# Patient Record
Sex: FEMALE | Race: WHITE | Employment: FULL TIME | URBAN - METROPOLITAN AREA
[De-identification: names, ages, dates, MRNs, and addresses within clinical notes are randomized per-mention and may not be internally consistent; named-entity substitution may affect disease eponyms.]

---

## 2018-11-20 ENCOUNTER — OFFICE VISIT (OUTPATIENT)
Dept: FAMILY MEDICINE CLINIC | Age: 38
End: 2018-11-20
Payer: COMMERCIAL

## 2018-11-20 VITALS
SYSTOLIC BLOOD PRESSURE: 91 MMHG | HEIGHT: 68 IN | HEART RATE: 70 BPM | WEIGHT: 127.4 LBS | DIASTOLIC BLOOD PRESSURE: 68 MMHG | OXYGEN SATURATION: 100 % | BODY MASS INDEX: 19.31 KG/M2

## 2018-11-20 DIAGNOSIS — Z11.4 SCREENING FOR HIV (HUMAN IMMUNODEFICIENCY VIRUS): ICD-10-CM

## 2018-11-20 DIAGNOSIS — R10.30 LOWER ABDOMINAL PAIN: ICD-10-CM

## 2018-11-20 DIAGNOSIS — E55.9 VITAMIN D DEFICIENCY: ICD-10-CM

## 2018-11-20 DIAGNOSIS — Z13.220 SCREENING, LIPID: ICD-10-CM

## 2018-11-20 DIAGNOSIS — R73.9 HYPERGLYCEMIA: ICD-10-CM

## 2018-11-20 DIAGNOSIS — R19.7 DIARRHEA, UNSPECIFIED TYPE: Primary | ICD-10-CM

## 2018-11-20 DIAGNOSIS — E53.9 VITAMIN B DEFICIENCY: ICD-10-CM

## 2018-11-20 DIAGNOSIS — Z13.29 SCREENING FOR THYROID DISORDER: ICD-10-CM

## 2018-11-20 PROCEDURE — G8427 DOCREV CUR MEDS BY ELIG CLIN: HCPCS | Performed by: FAMILY MEDICINE

## 2018-11-20 PROCEDURE — 99214 OFFICE O/P EST MOD 30 MIN: CPT | Performed by: FAMILY MEDICINE

## 2018-11-20 PROCEDURE — G8420 CALC BMI NORM PARAMETERS: HCPCS | Performed by: FAMILY MEDICINE

## 2018-11-20 PROCEDURE — G8484 FLU IMMUNIZE NO ADMIN: HCPCS | Performed by: FAMILY MEDICINE

## 2018-11-20 PROCEDURE — 1036F TOBACCO NON-USER: CPT | Performed by: FAMILY MEDICINE

## 2018-11-20 ASSESSMENT — ENCOUNTER SYMPTOMS
EYE PAIN: 0
VOMITING: 0
TROUBLE SWALLOWING: 0
BACK PAIN: 0
VOICE CHANGE: 0
RECTAL PAIN: 0
SHORTNESS OF BREATH: 0
DIARRHEA: 1
BLOOD IN STOOL: 0
ABDOMINAL PAIN: 0
EYE DISCHARGE: 0
COLOR CHANGE: 0
CHEST TIGHTNESS: 0
NAUSEA: 0
CONSTIPATION: 0
ANAL BLEEDING: 0
EYE REDNESS: 0
ABDOMINAL DISTENTION: 0
COUGH: 0
SINUS PRESSURE: 0

## 2018-11-20 ASSESSMENT — PATIENT HEALTH QUESTIONNAIRE - PHQ9
2. FEELING DOWN, DEPRESSED OR HOPELESS: 1
SUM OF ALL RESPONSES TO PHQ QUESTIONS 1-9: 1
1. LITTLE INTEREST OR PLEASURE IN DOING THINGS: 0
SUM OF ALL RESPONSES TO PHQ9 QUESTIONS 1 & 2: 0
SUM OF ALL RESPONSES TO PHQ QUESTIONS 1-9: 1
SUM OF ALL RESPONSES TO PHQ9 QUESTIONS 1 & 2: 1
SUM OF ALL RESPONSES TO PHQ QUESTIONS 1-9: 0
1. LITTLE INTEREST OR PLEASURE IN DOING THINGS: 0
SUM OF ALL RESPONSES TO PHQ QUESTIONS 1-9: 0
2. FEELING DOWN, DEPRESSED OR HOPELESS: 0

## 2018-11-24 LAB
ABSOLUTE BASO #: 0 K/UL (ref 0–0.1)
ABSOLUTE EOS #: 0.1 K/UL (ref 0.1–0.4)
ABSOLUTE LYMPH #: 2.5 K/UL (ref 0.8–5.2)
ABSOLUTE MONO #: 0.5 K/UL (ref 0.1–0.9)
ABSOLUTE NEUT #: 1.2 K/UL (ref 1.3–9.1)
ALBUMIN SERPL-MCNC: 4.2 G/DL (ref 3.5–5.2)
ALK PHOSPHATASE: 44 U/L (ref 30–101)
ALT SERPL-CCNC: 22 U/L (ref 5–40)
ANION GAP SERPL CALCULATED.3IONS-SCNC: 11 MEQ/L (ref 10–19)
AST SERPL-CCNC: 21 U/L (ref 9–40)
BASOPHILS RELATIVE PERCENT: 0.9 %
BILIRUB SERPL-MCNC: 0.6 MG/DL
BUN BLDV-MCNC: 13 MG/DL (ref 8–23)
CALCIUM SERPL-MCNC: 9.3 MG/DL (ref 8.5–10.5)
CHLORIDE BLD-SCNC: 103 MEQ/L (ref 95–107)
CHOLESTEROL/HDL RATIO: 2
CHOLESTEROL: 163 MG/DL
CO2: 26 MEQ/L (ref 19–31)
CREAT SERPL-MCNC: 0.8 MG/DL (ref 0.6–1.3)
EGFR AFRICAN AMERICAN: 108.4 ML/MIN/1.73 M2
EGFR IF NONAFRICAN AMERICAN: 93.5 ML/MIN/1.73 M2
EOSINOPHILS RELATIVE PERCENT: 1.6 %
FOLATE: 14 UG/L
GLUCOSE: 87 MG/DL (ref 70–99)
HCT VFR BLD CALC: 36.7 % (ref 36–48)
HDLC SERPL-MCNC: 79.6 MG/DL
HEMOGLOBIN: 12.2 G/DL (ref 12–16)
HIV 1,2 COMBO ANTIGEN/ANTIBODY: NORMAL
IRON SATURATION: 33 % (ref 20–50)
IRON, SERUM: 108 UG/DL (ref 37–145)
LDL CHOLESTEROL CALCULATED: 72 MG/DL
LDL/HDL RATIO: 0.9
LYMPHOCYTE %: 58 %
MCH RBC QN AUTO: 26.7 PG (ref 27–34)
MCHC RBC AUTO-ENTMCNC: 33.2 G/DL (ref 31–36)
MCV RBC AUTO: 80.3 FL (ref 80–100)
MONOCYTES # BLD: 11.3 %
NEUTROPHILS RELATIVE PERCENT: 28.2 %
PDW BLD-RTO: 13.1 % (ref 10.8–14.8)
PLATELETS: 262 K/UL (ref 150–450)
POTASSIUM SERPL-SCNC: 4.3 MEQ/L (ref 3.5–5.4)
RBC: 4.57 M/UL (ref 4–5.5)
SODIUM BLD-SCNC: 140 MEQ/L (ref 135–146)
T4 FREE: 1.16 NG/DL (ref 0.8–1.9)
TOTAL IRON BINDING CAPACITY: 330 MCG/DL (ref 250–450)
TOTAL PROTEIN: 6.7 G/DL (ref 6.1–8.3)
TRIGL SERPL-MCNC: 58 MG/DL
TSH SERPL DL<=0.05 MIU/L-ACNC: 2.01 UIU/ML (ref 0.4–4.1)
UNSATURATED IRON BINDING CAPACITY: 222.2 UG/DL (ref 112–347)
VITAMIN B-12: 512 PG/ML (ref 200–950)
VLDLC SERPL CALC-MCNC: 12 MG/DL
WBC: 4.3 K/UL (ref 3.7–10.8)

## 2018-11-27 LAB
T3 FREE: 2.7 PG/ML (ref 2.2–4.2)
VITAMIN D 25-HYDROXY: 34 NG/ML

## 2018-11-28 LAB
AVERAGE GLUCOSE: NORMAL MG/DL (ref 66–114)
HBA1C MFR BLD: NORMAL %

## 2019-03-18 ENCOUNTER — OFFICE VISIT (OUTPATIENT)
Dept: FAMILY MEDICINE CLINIC | Age: 39
End: 2019-03-18
Payer: COMMERCIAL

## 2019-03-18 VITALS
DIASTOLIC BLOOD PRESSURE: 65 MMHG | WEIGHT: 125.8 LBS | OXYGEN SATURATION: 100 % | HEIGHT: 68 IN | SYSTOLIC BLOOD PRESSURE: 98 MMHG | BODY MASS INDEX: 19.07 KG/M2 | HEART RATE: 67 BPM

## 2019-03-18 DIAGNOSIS — N92.6 MENSTRUAL IRREGULARITY: ICD-10-CM

## 2019-03-18 DIAGNOSIS — L29.9 ITCHING: Primary | ICD-10-CM

## 2019-03-18 DIAGNOSIS — R10.9 ABDOMINAL CRAMPS: ICD-10-CM

## 2019-03-18 DIAGNOSIS — N95.1 PERIMENOPAUSE: ICD-10-CM

## 2019-03-18 DIAGNOSIS — R19.7 DIARRHEA, UNSPECIFIED TYPE: ICD-10-CM

## 2019-03-18 PROCEDURE — G8484 FLU IMMUNIZE NO ADMIN: HCPCS | Performed by: FAMILY MEDICINE

## 2019-03-18 PROCEDURE — 99214 OFFICE O/P EST MOD 30 MIN: CPT | Performed by: FAMILY MEDICINE

## 2019-03-18 PROCEDURE — G8420 CALC BMI NORM PARAMETERS: HCPCS | Performed by: FAMILY MEDICINE

## 2019-03-18 PROCEDURE — G8427 DOCREV CUR MEDS BY ELIG CLIN: HCPCS | Performed by: FAMILY MEDICINE

## 2019-03-18 PROCEDURE — 1036F TOBACCO NON-USER: CPT | Performed by: FAMILY MEDICINE

## 2019-03-18 RX ORDER — FAMOTIDINE 20 MG/1
20 TABLET, FILM COATED ORAL 2 TIMES DAILY
Qty: 60 TABLET | Refills: 0 | Status: SHIPPED | OUTPATIENT
Start: 2019-03-18 | End: 2019-05-21

## 2019-03-18 RX ORDER — CETIRIZINE HYDROCHLORIDE 10 MG/1
10 TABLET ORAL DAILY
Qty: 30 TABLET | Refills: 0 | Status: SHIPPED | OUTPATIENT
Start: 2019-03-18 | End: 2019-04-17

## 2019-03-18 ASSESSMENT — ENCOUNTER SYMPTOMS
SHORTNESS OF BREATH: 0
ANAL BLEEDING: 0
VOICE CHANGE: 0
COLOR CHANGE: 0
CHEST TIGHTNESS: 0
BACK PAIN: 0
VOMITING: 0
CONSTIPATION: 0
EYE DISCHARGE: 0
SINUS PRESSURE: 0
COUGH: 0
TROUBLE SWALLOWING: 0
RECTAL PAIN: 0
ABDOMINAL PAIN: 0
ABDOMINAL DISTENTION: 0
DIARRHEA: 0
NAUSEA: 0
EYE PAIN: 0
BLOOD IN STOOL: 0
EYE REDNESS: 0

## 2019-03-18 ASSESSMENT — PATIENT HEALTH QUESTIONNAIRE - PHQ9
1. LITTLE INTEREST OR PLEASURE IN DOING THINGS: 0
SUM OF ALL RESPONSES TO PHQ QUESTIONS 1-9: 0
2. FEELING DOWN, DEPRESSED OR HOPELESS: 0
2. FEELING DOWN, DEPRESSED OR HOPELESS: 0
SUM OF ALL RESPONSES TO PHQ QUESTIONS 1-9: 0
1. LITTLE INTEREST OR PLEASURE IN DOING THINGS: 0
SUM OF ALL RESPONSES TO PHQ QUESTIONS 1-9: 0
SUM OF ALL RESPONSES TO PHQ9 QUESTIONS 1 & 2: 0
SUM OF ALL RESPONSES TO PHQ9 QUESTIONS 1 & 2: 0
SUM OF ALL RESPONSES TO PHQ QUESTIONS 1-9: 0

## 2019-05-10 ENCOUNTER — TELEPHONE (OUTPATIENT)
Dept: GASTROENTEROLOGY | Age: 39
End: 2019-05-10

## 2019-05-10 NOTE — LETTER
[address] [facility]  [address] [address]  Phone: [phone]  Fax: [place] [address]    5/10/2019        Dear [facility],    We have tried to reach you by phone and have not been successful. Your appointment on 05/21/19 has been move up to an eariler time due to a change in your provider's schedule. We rescheduled your appointment for :    Date Time Provider Department   5/21/2019  2:30 PM Nisha Moody MD [facility] PROVIDERS [facility]       We apologize for the inconvenience and appreciate your understanding. If you are unable to make this appointment, please call the office at [phone] (option 3).     Sincerely,  Nisha Moody MD

## 2019-05-21 ENCOUNTER — OFFICE VISIT (OUTPATIENT)
Dept: GASTROENTEROLOGY | Age: 39
End: 2019-05-21
Payer: COMMERCIAL

## 2019-05-21 VITALS
SYSTOLIC BLOOD PRESSURE: 90 MMHG | HEART RATE: 65 BPM | BODY MASS INDEX: 19.01 KG/M2 | WEIGHT: 125 LBS | DIASTOLIC BLOOD PRESSURE: 60 MMHG

## 2019-05-21 DIAGNOSIS — R19.5 LOOSE STOOLS: ICD-10-CM

## 2019-05-21 PROCEDURE — G8420 CALC BMI NORM PARAMETERS: HCPCS | Performed by: INTERNAL MEDICINE

## 2019-05-21 PROCEDURE — 1036F TOBACCO NON-USER: CPT | Performed by: INTERNAL MEDICINE

## 2019-05-21 PROCEDURE — 99203 OFFICE O/P NEW LOW 30 MIN: CPT | Performed by: INTERNAL MEDICINE

## 2019-05-21 PROCEDURE — G8427 DOCREV CUR MEDS BY ELIG CLIN: HCPCS | Performed by: INTERNAL MEDICINE

## 2019-05-21 RX ORDER — WHEAT DEXTRIN 3 G/3.8 G
4 POWDER (GRAM) ORAL DAILY
Qty: 30 CAN | Refills: 0 | Status: SHIPPED | OUTPATIENT
Start: 2019-05-21 | End: 2020-03-26

## 2019-05-21 NOTE — PROGRESS NOTES
GI CLINIC FOLLOW UP    INTERVAL HISTORY:   Ignacia Hoffman MD  [address], [address] [facility]    Chief Complaint   Patient presents with    Diarrhea     Started loose stool last September, once or twice a day. Has had cramps on occasion that come out of nowhere but not related to the loose stool. The cramps are lower stomach like the gut.  Other     She also complains that she also have been itching all over her body for months as well and lotion does not help           HISTORY OF PRESENT ILLNESS: Brian Turner is a 45 y.o. female with a pasthistory remarkable for sarcoidosis, pulmonary involvement, possible cutaneous involvement, referred for evaluation of loose bowel movements and increase amount of gas distention. Loose bowel movements, once daily. No floating, no blood. No preceding symptoms, x 5 months. Stomach cramps: losing weight, gaining. No changes in appetite. No recent travel. No changes in diet. Evening, unrelated to PO intake. Once a week. Duration of 1 hour. Flatus. No obvious food triggers. FH- no celiac, GF- stomach CA, Mother- IBS  No drinking, No smoking  No prior EGD or Colonoscopy   No food allergies. Irregular menses also noted at that time- prolonged few days. Past Medical,Family, and Social History reviewed and does contribute to the patient presentingcondition. Patient's PMH/PSH,SH,PSYCH Hx, MEDs, ALLERGIES, and ROS were all reviewed and updated in the appropriate sections.     PAST MEDICAL HISTORY:  Past Medical History:   Diagnosis Date    Autoimmune disorder ([address]     sarcoidosis age 25 resolved   Tarri Shaggy Blood type B+     Drug effect     penicillin rash     yeast infection     Family history of autism     Son (P1)    Sarcoidosis of skin ([address]     and lung     Trauma     rt arm fracture age 23       Past Surgical History:   Procedure Laterality Date    LUNG BIOPSY      sarcoidosis    WISDOM TOOTH EXTRACTION      age 15 CURRENT MEDICATIONS:  No current outpatient medications on file.     ALLERGIES:   Allergies   Allergen Reactions    Penicillins Hives and Rash     Yeast infection       FAMILY HISTORY:       Problem Relation Age of Onset   [facility] Asthma Mother     Other Mother         blood transfusion    Breast Cancer Maternal Grandmother     Parkinsonism Maternal Grandmother     Diabetes Maternal Grandmother     Alzheimer's Disease Paternal Grandfather     Diabetes Father         Type 2         SOCIAL HISTORY:   Social History     Socioeconomic History    Marital status:      Spouse name: Not on file    Number of children: Not on file    Years of education: Not on file    Highest education level: Not on file   Occupational History    Not on file   Social Needs    Financial resource strain: Not on file    Food insecurity:     Worry: Not on file     Inability: Not on file    Transportation needs:     Medical: Not on file     Non-medical: Not on file   Tobacco Use    Smoking status: Never Smoker    Smokeless tobacco: Never Used   Substance and Sexual Activity    Alcohol use: Yes     Comment: occasionally    Drug use: No    Sexual activity: Yes     Partners: Male   Lifestyle    Physical activity:     Days per week: Not on file     Minutes per session: Not on file    Stress: Not on file   Relationships    Social connections:     Talks on phone: Not on file     Gets together: Not on file     Attends Restoration service: Not on file     Active member of club or organization: Not on file     Attends meetings of clubs or organizations: Not on file     Relationship status: Not on file    Intimate partner violence:     Fear of current or ex partner: Not on file     Emotionally abused: Not on file     Physically abused: Not on file     Forced sexual activity: Not on file   Other Topics Concern    Not on file   Social History Narrative    Not on file       REVIEW OF SYSTEMS: A 12-point review of systemswas

## 2019-05-28 ENCOUNTER — TELEPHONE (OUTPATIENT)
Dept: GASTROENTEROLOGY | Age: 39
End: 2019-05-28

## 2019-05-28 NOTE — TELEPHONE ENCOUNTER
Writer received voicemail from patient questioning if her labs were fasting. Writer called patient back and informed her that her labs are not fasting and she can have them done at any time. Writer states if any questions call [phone].

## 2019-05-29 ENCOUNTER — HOSPITAL ENCOUNTER (OUTPATIENT)
Age: 39
Setting detail: SPECIMEN
Discharge: HOME | End: 2019-05-29
Payer: COMMERCIAL

## 2019-05-29 DIAGNOSIS — R19.5 LOOSE STOOLS: ICD-10-CM

## 2019-05-29 LAB
ABSOLUTE EOS #: 0.07 K/UL (ref 0–0.44)
ABSOLUTE IMMATURE GRANULOCYTE: <0.03 K/UL (ref 0–0.3)
ABSOLUTE LYMPH #: 1.86 K/UL (ref 1.1–3.7)
ABSOLUTE MONO #: 0.49 K/UL (ref 0.1–1.2)
ALBUMIN SERPL-MCNC: 4.3 G/DL (ref 3.5–5.2)
ALBUMIN/GLOBULIN RATIO: 1.5 (ref 1–2.5)
ALP BLD-CCNC: 67 U/L (ref 35–104)
ALT SERPL-CCNC: 30 U/L (ref 5–33)
ANION GAP SERPL CALCULATED.3IONS-SCNC: 13 MMOL/L (ref 9–17)
AST SERPL-CCNC: 34 U/L
BASOPHILS # BLD: 1 % (ref 0–2)
BASOPHILS ABSOLUTE: 0.05 K/UL (ref 0–0.2)
BILIRUB SERPL-MCNC: 0.38 MG/DL (ref 0.3–1.2)
BILIRUBIN DIRECT: 0.09 MG/DL
BILIRUBIN, INDIRECT: 0.29 MG/DL (ref 0–1)
BUN BLDV-MCNC: 12 MG/DL (ref 6–20)
BUN/CREAT BLD: NORMAL (ref 9–20)
CALCIUM SERPL-MCNC: 9.3 MG/DL (ref 8.6–10.4)
CHLORIDE BLD-SCNC: 102 MMOL/L (ref 98–107)
CO2: 26 MMOL/L (ref 20–31)
CREAT SERPL-MCNC: 0.71 MG/DL (ref 0.5–0.9)
DIFFERENTIAL TYPE: ABNORMAL
EOSINOPHILS RELATIVE PERCENT: 1 % (ref 1–4)
FOLATE: 16 NG/ML
GFR AFRICAN AMERICAN: >60 ML/MIN
GFR NON-AFRICAN AMERICAN: >60 ML/MIN
GFR SERPL CREATININE-BSD FRML MDRD: NORMAL ML/MIN/{1.73_M2}
GFR SERPL CREATININE-BSD FRML MDRD: NORMAL ML/MIN/{1.73_M2}
GLOBULIN: ABNORMAL G/DL (ref 1.5–3.8)
GLUCOSE BLD-MCNC: 81 MG/DL (ref 70–99)
HCT VFR BLD CALC: 39.2 % (ref 36.3–47.1)
HEMOGLOBIN: 11.7 G/DL (ref 11.9–15.1)
IMMATURE GRANULOCYTES: 0 %
IRON SATURATION: 16 % (ref 20–55)
IRON: 55 UG/DL (ref 37–145)
LYMPHOCYTES # BLD: 27 % (ref 24–43)
MCH RBC QN AUTO: 25.3 PG (ref 25.2–33.5)
MCHC RBC AUTO-ENTMCNC: 29.8 G/DL (ref 28.4–34.8)
MCV RBC AUTO: 84.7 FL (ref 82.6–102.9)
MONOCYTES # BLD: 7 % (ref 3–12)
NRBC AUTOMATED: 0 PER 100 WBC
PDW BLD-RTO: 14.6 % (ref 11.8–14.4)
PLATELET # BLD: 283 K/UL (ref 138–453)
PLATELET ESTIMATE: ABNORMAL
PMV BLD AUTO: 11.1 FL (ref 8.1–13.5)
POTASSIUM SERPL-SCNC: 3.9 MMOL/L (ref 3.7–5.3)
RBC # BLD: 4.63 M/UL (ref 3.95–5.11)
RBC # BLD: ABNORMAL 10*6/UL
SEG NEUTROPHILS: 64 % (ref 36–65)
SEGMENTED NEUTROPHILS ABSOLUTE COUNT: 4.38 K/UL (ref 1.5–8.1)
SODIUM BLD-SCNC: 141 MMOL/L (ref 135–144)
TOTAL IRON BINDING CAPACITY: 343 UG/DL (ref 250–450)
TOTAL PROTEIN: 7.2 G/DL (ref 6.4–8.3)
UNSATURATED IRON BINDING CAPACITY: 288 UG/DL (ref 112–347)
VITAMIN B-12: 414 PG/ML (ref 232–1245)
WBC # BLD: 6.9 K/UL (ref 3.5–11.3)
WBC # BLD: ABNORMAL 10*3/UL

## 2019-05-30 LAB
GLIADIN DEAMINIDATED PEPTIDE AB IGA: 0.9 U/ML
GLIADIN DEAMINIDATED PEPTIDE AB IGG: <0.4 U/ML
IGA: 233 MG/DL (ref 70–400)
TISSUE TRANSGLUTAMINASE IGA: 0.3 U/ML

## 2019-06-04 LAB — IBD PANEL: NORMAL

## 2020-03-26 ENCOUNTER — OFFICE VISIT (OUTPATIENT)
Dept: FAMILY MEDICINE CLINIC | Age: 40
End: 2020-03-26
Payer: COMMERCIAL

## 2020-03-26 ENCOUNTER — HOSPITAL ENCOUNTER (OUTPATIENT)
Age: 40
Setting detail: SPECIMEN
Discharge: HOME | End: 2020-03-26
Payer: COMMERCIAL

## 2020-03-26 ENCOUNTER — TELEPHONE (OUTPATIENT)
Dept: FAMILY MEDICINE CLINIC | Age: 40
End: 2020-03-26

## 2020-03-26 VITALS
BODY MASS INDEX: 18.66 KG/M2 | HEART RATE: 53 BPM | WEIGHT: 126 LBS | SYSTOLIC BLOOD PRESSURE: 92 MMHG | OXYGEN SATURATION: 100 % | DIASTOLIC BLOOD PRESSURE: 61 MMHG | HEIGHT: 69 IN

## 2020-03-26 PROBLEM — R30.0 DYSURIA: Status: ACTIVE | Noted: 2020-03-26

## 2020-03-26 PROBLEM — R10.9 FLANK PAIN: Status: ACTIVE | Noted: 2020-03-26

## 2020-03-26 LAB
BILIRUBIN, POC: NORMAL
BLOOD URINE, POC: NORMAL
CLARITY, POC: CLEAR
COLOR, POC: YELLOW
GLUCOSE URINE, POC: NORMAL
KETONES, POC: NORMAL
LEUKOCYTE EST, POC: NORMAL
NITRITE, POC: NORMAL
PH, POC: 6
PROTEIN, POC: NORMAL
SPECIFIC GRAVITY, POC: 1.01
UROBILINOGEN, POC: 0.2

## 2020-03-26 PROCEDURE — G8427 DOCREV CUR MEDS BY ELIG CLIN: HCPCS | Performed by: FAMILY MEDICINE

## 2020-03-26 PROCEDURE — 1036F TOBACCO NON-USER: CPT | Performed by: FAMILY MEDICINE

## 2020-03-26 PROCEDURE — 81003 URINALYSIS AUTO W/O SCOPE: CPT | Performed by: FAMILY MEDICINE

## 2020-03-26 PROCEDURE — 99213 OFFICE O/P EST LOW 20 MIN: CPT | Performed by: FAMILY MEDICINE

## 2020-03-26 PROCEDURE — G8420 CALC BMI NORM PARAMETERS: HCPCS | Performed by: FAMILY MEDICINE

## 2020-03-26 PROCEDURE — G8484 FLU IMMUNIZE NO ADMIN: HCPCS | Performed by: FAMILY MEDICINE

## 2020-03-26 ASSESSMENT — PATIENT HEALTH QUESTIONNAIRE - PHQ9
SUM OF ALL RESPONSES TO PHQ QUESTIONS 1-9: 0
SUM OF ALL RESPONSES TO PHQ9 QUESTIONS 1 & 2: 0
2. FEELING DOWN, DEPRESSED OR HOPELESS: 0
SUM OF ALL RESPONSES TO PHQ QUESTIONS 1-9: 0
SUM OF ALL RESPONSES TO PHQ QUESTIONS 1-9: 0
1. LITTLE INTEREST OR PLEASURE IN DOING THINGS: 0
2. FEELING DOWN, DEPRESSED OR HOPELESS: 0
SUM OF ALL RESPONSES TO PHQ QUESTIONS 1-9: 0
1. LITTLE INTEREST OR PLEASURE IN DOING THINGS: 0
SUM OF ALL RESPONSES TO PHQ9 QUESTIONS 1 & 2: 0

## 2020-03-26 NOTE — PROGRESS NOTES
Urinalysis Reflex to Culture    CBC With Auto Differential   2. Flank pain  CBC With Auto Differential    Basic Metabolic Panel         Plan:      Orders Placed This Encounter   Procedures    Urinalysis Reflex to Culture    CBC With Auto Differential    Basic Metabolic Panel    POCT Urinalysis No Micro (Auto)       Outpatient Encounter Medications as of 3/26/2020   Medication Sig Dispense Refill    [DISCONTINUED] Wheat Dextrin (BENEFIBER) POWD Take 4 g by mouth daily (Patient not taking: Reported on 3/26/2020) 30 Can 0     No facility-administered encounter medications on file as of 3/26/2020.         Mild tenderness left flank with palpation, will do CBC and BMP if sx persist.UA sent out for c/s    Christophe Johnston MD

## 2020-03-26 NOTE — TELEPHONE ENCOUNTER
Pt called stating she woke up today having burning with urination. Pt would like something called in for UTI. Please call & advise.     Last ov: 3/18/19  Next ov: nothing scheduled

## 2020-03-27 LAB
-: NORMAL
AMORPHOUS: NORMAL
BACTERIA: NORMAL
BILIRUBIN URINE: NEGATIVE
CASTS UA: NORMAL /LPF (ref 0–8)
COLOR: YELLOW
COMMENT UA: ABNORMAL
CRYSTALS, UA: NORMAL /HPF
CULTURE: NORMAL
EPITHELIAL CELLS UA: NORMAL /HPF (ref 0–5)
GLUCOSE URINE: NEGATIVE
KETONES, URINE: NEGATIVE
LEUKOCYTE ESTERASE, URINE: ABNORMAL
Lab: NORMAL
MUCUS: NORMAL
NITRITE, URINE: NEGATIVE
OTHER OBSERVATIONS UA: NORMAL
PH UA: 6 (ref 5–8)
PROTEIN UA: NEGATIVE
RBC UA: NORMAL /HPF (ref 0–4)
RENAL EPITHELIAL, UA: NORMAL /HPF
SPECIFIC GRAVITY UA: 1.01 (ref 1–1.03)
SPECIMEN DESCRIPTION: NORMAL
TRICHOMONAS: NORMAL
TURBIDITY: CLEAR
URINE HGB: NEGATIVE
UROBILINOGEN, URINE: NORMAL
WBC UA: NORMAL /HPF (ref 0–5)
YEAST: NORMAL

## 2022-02-21 ENCOUNTER — OFFICE VISIT (OUTPATIENT)
Dept: FAMILY MEDICINE CLINIC | Age: 42
End: 2022-02-21
Payer: COMMERCIAL

## 2022-02-21 VITALS
OXYGEN SATURATION: 98 % | WEIGHT: 130.2 LBS | SYSTOLIC BLOOD PRESSURE: 97 MMHG | HEART RATE: 57 BPM | DIASTOLIC BLOOD PRESSURE: 61 MMHG | HEIGHT: 69 IN | BODY MASS INDEX: 19.28 KG/M2 | TEMPERATURE: 97.2 F

## 2022-02-21 DIAGNOSIS — M25.559 HIP PAIN: ICD-10-CM

## 2022-02-21 DIAGNOSIS — E53.9 VITAMIN B DEFICIENCY: ICD-10-CM

## 2022-02-21 DIAGNOSIS — R73.9 HYPERGLYCEMIA: ICD-10-CM

## 2022-02-21 DIAGNOSIS — E55.9 VITAMIN D DEFICIENCY: ICD-10-CM

## 2022-02-21 DIAGNOSIS — Z13.220 SCREENING FOR LIPID DISORDERS: ICD-10-CM

## 2022-02-21 DIAGNOSIS — Z11.4 ENCOUNTER FOR SCREENING FOR HIV: ICD-10-CM

## 2022-02-21 DIAGNOSIS — Z13.29 SCREENING FOR THYROID DISORDER: Primary | ICD-10-CM

## 2022-02-21 DIAGNOSIS — Z11.4 SCREENING FOR HIV (HUMAN IMMUNODEFICIENCY VIRUS): ICD-10-CM

## 2022-02-21 PROCEDURE — 99214 OFFICE O/P EST MOD 30 MIN: CPT | Performed by: FAMILY MEDICINE

## 2022-02-21 SDOH — ECONOMIC STABILITY: FOOD INSECURITY: WITHIN THE PAST 12 MONTHS, YOU WORRIED THAT YOUR FOOD WOULD RUN OUT BEFORE YOU GOT MONEY TO BUY MORE.: NEVER TRUE

## 2022-02-21 SDOH — ECONOMIC STABILITY: FOOD INSECURITY: WITHIN THE PAST 12 MONTHS, THE FOOD YOU BOUGHT JUST DIDN'T LAST AND YOU DIDN'T HAVE MONEY TO GET MORE.: NEVER TRUE

## 2022-02-21 ASSESSMENT — ENCOUNTER SYMPTOMS
ABDOMINAL PAIN: 0
SINUS PRESSURE: 0
BACK PAIN: 0
RECTAL PAIN: 0
SHORTNESS OF BREATH: 0
DIARRHEA: 0
ANAL BLEEDING: 0
CONSTIPATION: 0
VOICE CHANGE: 0
EYE REDNESS: 0
ABDOMINAL DISTENTION: 0
NAUSEA: 0
COUGH: 0
VOMITING: 0
EYE PAIN: 0
TROUBLE SWALLOWING: 0
COLOR CHANGE: 0
CHEST TIGHTNESS: 0
BLOOD IN STOOL: 0
EYE DISCHARGE: 0

## 2022-02-21 ASSESSMENT — PATIENT HEALTH QUESTIONNAIRE - PHQ9
SUM OF ALL RESPONSES TO PHQ9 QUESTIONS 1 & 2: 0
SUM OF ALL RESPONSES TO PHQ QUESTIONS 1-9: 0
2. FEELING DOWN, DEPRESSED OR HOPELESS: 0
SUM OF ALL RESPONSES TO PHQ QUESTIONS 1-9: 0
2. FEELING DOWN, DEPRESSED OR HOPELESS: 0
SUM OF ALL RESPONSES TO PHQ QUESTIONS 1-9: 0
SUM OF ALL RESPONSES TO PHQ9 QUESTIONS 1 & 2: 0
1. LITTLE INTEREST OR PLEASURE IN DOING THINGS: 0
1. LITTLE INTEREST OR PLEASURE IN DOING THINGS: 0
SUM OF ALL RESPONSES TO PHQ QUESTIONS 1-9: 0

## 2022-02-21 ASSESSMENT — SOCIAL DETERMINANTS OF HEALTH (SDOH): HOW HARD IS IT FOR YOU TO PAY FOR THE VERY BASICS LIKE FOOD, HOUSING, MEDICAL CARE, AND HEATING?: NOT HARD AT ALL

## 2022-02-21 NOTE — PROGRESS NOTES
Subjective:      Patient ID: Sue Mondragon is a 39 y.o. female. States she has the runners B/L hips bother her regularly - over past 3 weeks the right hip pain is really bad. States she intensified her runs. States does do stretches before she runs. With newer routine- she was running 1 mile fast and 2 miles slow 3 days a week. Mood, sleep, appetite good. States BM are regular. No weight loss. Review of Systems   Constitutional: Negative for activity change, appetite change and fatigue. HENT: Negative for dental problem, ear pain, hearing loss, postnasal drip, sinus pressure, sneezing, tinnitus, trouble swallowing and voice change. Eyes: Negative for pain, discharge, redness and visual disturbance. Respiratory: Negative for cough, chest tightness and shortness of breath. Cardiovascular: Negative for chest pain, palpitations and leg swelling. Gastrointestinal: Negative for abdominal distention, abdominal pain, anal bleeding, blood in stool, constipation, diarrhea, nausea, rectal pain and vomiting. Endocrine: Negative for cold intolerance, heat intolerance, polydipsia, polyphagia and polyuria. Genitourinary: Negative for decreased urine volume, difficulty urinating, dyspareunia, dysuria, enuresis, flank pain, frequency, genital sores, hematuria, menstrual problem, pelvic pain, urgency, vaginal bleeding and vaginal discharge. Musculoskeletal: Positive for arthralgias. Negative for back pain, gait problem, joint swelling, myalgias, neck pain and neck stiffness. Skin: Negative for color change, pallor and rash. Allergic/Immunologic: Negative for environmental allergies, food allergies and immunocompromised state. Neurological: Negative for dizziness, tremors, seizures, syncope, facial asymmetry, speech difficulty, weakness, light-headedness, numbness and headaches. Hematological: Negative for adenopathy. Does not bruise/bleed easily.    Psychiatric/Behavioral: Negative for agitation, behavioral problems, confusion, decreased concentration, sleep disturbance and suicidal ideas. The patient is not nervous/anxious. Objective:   Physical Exam  Constitutional:       General: She is not in acute distress. HENT:      Head: Normocephalic and atraumatic. Right Ear: External ear normal.      Left Ear: External ear normal.      Nose: Nose normal.      Mouth/Throat:      Mouth: Mucous membranes are moist.   Eyes:      Conjunctiva/sclera: Conjunctivae normal.      Pupils: Pupils are equal, round, and reactive to light. Neck:      Thyroid: No thyromegaly. Trachea: No tracheal deviation. Cardiovascular:      Rate and Rhythm: Normal rate and regular rhythm. Pulses: Normal pulses. Heart sounds: Normal heart sounds. No murmur heard. No friction rub. No gallop. Pulmonary:      Effort: Pulmonary effort is normal. No respiratory distress. Breath sounds: Normal breath sounds. No stridor. No wheezing or rales. Chest:      Chest wall: No tenderness. Abdominal:      General: Bowel sounds are normal. There is no distension. Palpations: Abdomen is soft. Tenderness: There is no abdominal tenderness. There is no rebound. Musculoskeletal:         General: Normal range of motion. Cervical back: Normal range of motion. Lymphadenopathy:      Cervical: No cervical adenopathy. Skin:     General: Skin is warm. Coloration: Skin is not pale. Findings: No erythema or rash. Neurological:      General: No focal deficit present. Mental Status: She is alert and oriented to person, place, and time. Mental status is at baseline. Cranial Nerves: No cranial nerve deficit. Motor: No abnormal muscle tone. Deep Tendon Reflexes: Reflexes normal.   Psychiatric:         Mood and Affect: Mood normal.         Behavior: Behavior normal.         Thought Content:  Thought content normal.         Judgment: Judgment normal.          Vitals:    02/21/22 1503 BP: 97/61   Pulse: 57   Temp: 97.2 °F (36.2 °C)   SpO2: 98%         Assessment:      Diagnosis Orders   1. Hip pain           Plan:     15 minutes spent with patient counseling/educating on acute/chronic medical health problems, medications,  along with treatment options. Reviewed multiple labs/imaging/medications with patient during this time. Following Diet discussion/education was done on healthy diet. In addition Exercise plan and depression screen were discussed. Recent labs/imaging were discussed and reviewed with patient.

## 2022-03-02 ENCOUNTER — HOSPITAL ENCOUNTER (OUTPATIENT)
Dept: PHYSICAL THERAPY | Facility: CLINIC | Age: 42
Setting detail: THERAPIES SERIES
Discharge: HOME | End: 2022-03-02
Payer: COMMERCIAL

## 2022-03-02 LAB
ALBUMIN SERPL-MCNC: 4.2 G/DL (ref 3.2–5.3)
ALK PHOSPHATASE: 40 U/L (ref 39–130)
ALT SERPL-CCNC: 16 U/L (ref 0–31)
ANION GAP SERPL CALCULATED.3IONS-SCNC: 8 MMOL/L (ref 5–15)
AST SERPL-CCNC: 17 U/L (ref 0–41)
AVERAGE GLUCOSE: 117 MG/DL
BILIRUB SERPL-MCNC: 0.7 MG/DL (ref 0.3–1.2)
BUN BLDV-MCNC: 14 MG/DL (ref 5–23)
CALCIUM SERPL-MCNC: 8.6 MG/DL (ref 8.5–10.5)
CHLORIDE BLD-SCNC: 107 MMOL/L (ref 98–109)
CHOLESTEROL/HDL RATIO: 2.1 (ref 1–5)
CHOLESTEROL: 159 MG/DL (ref 150–200)
CO2: 26 MMOL/L (ref 22–32)
CREAT SERPL-MCNC: 0.8 MG/DL (ref 0.4–1)
EGFR AFRICAN AMERICAN: >60 ML/MIN/1.73SQ.M
EGFR IF NONAFRICAN AMERICAN: >60 ML/MIN/1.73SQ.M
EOSINOPHIL # BLD: 2.8 %
EOSINOPHILS ABSOLUTE: 0.1 X10E9/L (ref 0–0.4)
FOLATE: 9.7 NG/ML
GLUCOSE: 82 MG/DL (ref 65–99)
HBA1C MFR BLD: 5.7 % (ref 4.4–6.4)
HCT VFR BLD CALC: 32.8 % (ref 35–47)
HDLC SERPL-MCNC: 75 MG/DL
HEMOGLOBIN: 10.8 G/DL (ref 11.7–15.5)
HEPATITIS C ANTIBODY: NORMAL
LDL CHOLESTEROL CALCULATED: 73 MG/DL
LDL/HDL RATIO: 1
LYMPHOCYTES # BLD: 42.6 %
LYMPHOCYTES ABSOLUTE: 1.3 X10E9/L (ref 1–3.5)
MCH RBC QN AUTO: 24.5 PG (ref 27–34)
MCHC RBC AUTO-ENTMCNC: 32.8 G/DL (ref 32–36)
MCV RBC AUTO: 75 FL (ref 80–100)
MONOCYTES # BLD: 17.6 %
MONOCYTES ABSOLUTE: 0.5 X10E9/L (ref 0–0.9)
NEUTROPHILS ABSOLUTE: 1.1 X10E9/L (ref 1.5–6.6)
NEUTROPHILS RELATIVE PERCENT: 37 %
PDW BLD-RTO: 15.9 % (ref 11.5–15)
PLATELETS: 258 X10E9/L (ref 150–450)
PMV BLD AUTO: 8.1 FL (ref 7–12)
POTASSIUM SERPL-SCNC: 4 MMOL/L (ref 3.5–5)
RBC # BLD: ABNORMAL 10*6/UL
RBC: 4.4 X10E12/L (ref 3.8–5.2)
SODIUM BLD-SCNC: 141 MMOL/L (ref 134–146)
T4 FREE: 0.84 NG/DL (ref 0.61–1.6)
TOTAL PROTEIN: 6.4 G/DL (ref 6–8)
TRIGL SERPL-MCNC: 53 MG/DL (ref 27–150)
TSH SERPL DL<=0.05 MIU/L-ACNC: 1.52 UIU/ML (ref 0.49–4.67)
VITAMIN B-12: 207 PG/ML (ref 180–914)
VITAMIN D 25-HYDROXY: 26.1 NG/ML (ref 30–100)
VLDLC SERPL CALC-MCNC: 11 MG/DL (ref 0–30)
WBC: 3.1 X10E9/L (ref 4–11)

## 2022-03-02 PROCEDURE — 97161 PT EVAL LOW COMPLEX 20 MIN: CPT

## 2022-03-02 PROCEDURE — 97110 THERAPEUTIC EXERCISES: CPT

## 2022-03-02 PROCEDURE — 97140 MANUAL THERAPY 1/> REGIONS: CPT

## 2022-03-02 NOTE — CONSULTS
[x] [address]  [facility] [address]   [address]  P: [phone]  F: [phone] [] [address] [facility]  [address] [facility]   [address]  P: [phone]  F: [phone]       Physical Therapy Lower Extremity Evaluation    Date:  3/2/2022  Patient: Tristian Valero  : 1980  MRN: [id number]  Physician: Dr. Carrillo Guevara MD     Insurance: [employer] (40 Vs, HARD MAX, benefit period 2021-2022)  Medical Diagnosis: M25.559 - hip pain  Rehab Codes: M25.551, M25.552, M62.81  Onset date: 2022 referral  Next 's appt. : 2022    Subjective:   CC: bilateral anterior hip pain  HPI: Pt reports that beginning in 2020 she started noticing increased bilateral anterior hip tenderness and tightness following her runs and then she reports that pain levels became more frequent and she started noting increased pain while running. Pt reports improvement with taking the last 3 weeks off running. Pt reports that pain was coming on right away and would increase with running faster. Pt reports that now her R side is worse than the other but she notes that previously she has noticed that her L hip had increased pain. Pt reports increased bilateral piriformis tenderness. Pt reports that she generally runs 3 days per week and low mileage. Pt can think of two incidents that may have increased her pain, she reports icing her ankle with her R hip held in IR last summer for an extended period of time and more recently she increased her speed. Pt denies numbness and tingling. Pt reports that she typically runs in a [employer] running shoe.      PMHx: [x] Unremarkable [x] Other: sarcoidosis              [x] Refer to full medical chart  In EPIC       Comorbidities:   [] Obesity [] Dialysis  [x] N/A   [] Asthma/COPD [] Dementia [] Other:   [] Stroke [] Sleep apnea [] Other:   [] Vascular disease [] Rheumatic disease [] Other: Tests: [] X-Ray: [] MRI:  [] Other:    Medications: [x] Refer to full medical record  Allergies:      [x] Refer to full medical record     Function:  Hand Dominance  [x] Right    Employement [employer]   Job status Teacher   Work Activities/duties     Recreational Activities Running, swimming     ADL/IADL Previous level of function Current level of function Who currently assists the patient with task   All ADL's [x] Independent  [] Assist [x] Independent  [] Assist      Gait Prior level of function Current level of function    [x] Independent  [] Assist [x] Independent  [] Assist   Device: [x] Independent [x] Independent       Pain present? Yes   Location Bilateral anterior hips   Pain Rating currently 1/10   Pain altered treatment N/A   Pain at worse 7/10 while running   Pain at best 1/10   Description of pain Tender, occasionally sharp   Altered Sensation N/A   What makes it worse Running (faster running), prolonged sitting   What makes it better Rest, change in position   Symptom progression Improving with rest   Sleep Not disturbed           Objective:    ROM  ° A/P STRENGTH TESTS (+/-) Left Right Not Tested    Left Right Left Right Ant.  Drawer   []   Hip Flex   4+/5 5/5 Post. Drawer   []   Ext   4+/5 4/5 Lachmans   []   ER     Valgus Stress   []   IR     Varus Stress   []   ABD   4/5 4/5 Lonnies   []   ADD     Apleys Comp.   []   Knee Flex   5/5 5/5 Apleys Dist.   []   Ext   5/5 5/5 Hip Scouring + + []   Ankle DF (knee straight)   5/5 5/5 MILESs - - []   DF (knee bent)     Piriformis - + []   PF     Shirley's   []   INV     Neeraj   []   EVER     Talor Tilt   []   GTE     Pat-Fem Grind   []     Flexibility Normal Left tight Right tight   Hip flexor [] [x] [x]   quad [x] [] []   HS [x] [] []   piriformis [] [x] [x]   ITB [] [] []   gastroc [] [] [x]   Soleus  [] [] []    [] [] []    [] [] []        OBSERVATION No Deficit Deficit Not Tested Comments   Posture    Pt with resting lying posture with excessive bilateral LE ER   Forward Head [] [] []    Rounded Shoulders [] [] []    Kyphosis [] [] []    Lordosis [] [] []    Lateral Shift [] [] []    Scoliosis [] [] []    Iliac Crest [] [] []    PSIS [] [] []    ASIS [] [] []    Genu Valgus [x] [] []    Genu Varus [x] [] []    Genu Recurvatum [] [] []    Pronation [] [] []    Supination [] [] []    Leg Length Discrp [x] [] []    Slumped Sitting [] [x] []    Palpation [] [x] [] Tender to palpation with increased muscle spasm to bilateral hip flexor musculature and R piriformis   Sensation [x] [] []    Edema [x] [] []    Neurological [x] [] []    Patellar Mobility [x] [] []    Patellar Orientation [x] [] []    Gait [x] [] [] Analysis: no significant abnormalities present with normal walking gait, will perform run gait analysis when appropriate         FUNCTION Normal Difficult Unable   Sitting [] [x] []   Standing [] [x] []   Ambulation [] [x] []   Groom/Dress [x] [] []   Lift/Carry [] [x] []   Stairs [x] [] []   Bending [] [x] []   Squat [] [x] []   Kneel [x] [] []     BALANCE/PROPRIOCEPTION              [] Not tested   Single leg stance       R                     L                                PAIN   Eyes open                 30          Sec. 30      Sec                  . []    Eyes closed                          Sec. Sec                  . []    Compensation with trunk lean and pelvic rotation present with SL standing bilaterally    FUNCTIONAL TESTS PAIN NO PAIN COMMENTS   Step Test 4 [] []    6 [] []    8 [] []    Squat [] [x] Slight R LE weight shift and L valgus collapse, slight increased anterior translation  SL squat demonstrating impaired valgus control greater on the L     Functional Test: Lower Extremity Functional Index (LEFI) Score: 56/80, 30% functionally impaired     Comments:    Assessment:  Yuniel Grace is a 39 y.o. runner presenting with increasing bilateral anterior hip pain with running over the past year.  Pt reports activities. 3. Pt will demonstrate improved functional activity tolerance as evident by an improved score on the LEFI to <15% functional impairment. Patient goals: \"to be able to run pain-free\"     Rehab Potential:  [x] Good  [] Fair  [] Poor   Suggested Professional Referral:  [x] No  [] Yes:  Barriers to Goal Achievement:  [x] No  [] Yes:  Domestic Concerns:  [x] No  [] Yes:    Pt. Education:  [x] Plans/Goals, Risks/Benefits discussed  [x] Home exercise program    Method of Education: [x] Verbal  [x] Demo  [x] Written  Access Code: [email or web address]  URL: [email or web address]  Date: 03/02/2022  Prepared by: Lucía Hugo  Bridge + Band Circuit - 1-2 x daily - 7 x weekly - 4 sets - 5 reps  Clam with Resistance - 1-2 x daily - 7 x weekly - 2 sets - 10 reps  Sidelying Hip Extension in Abduction - 1-2 x daily - 7 x weekly - 2 sets - 10 reps  Prone Hip Extension with Bent Knee - Two Pillows - 1-2 x daily - 7 x weekly - 2 sets - 10 reps  Half Kneeling Hip Flexor Stretch - 1-2 x daily - 7 x weekly - 1 sets - 1 reps - 60 hold  Standing Bilateral Gastroc Stretch with Step - 1-2 x daily - 7 x weekly - 1 sets - 1 reps - 60 hold    Comprehension of Education:  [x] Verbalizes understanding. [x] Demonstrates understanding. [x] Needs Review. [] Demonstrates/verbalizes understanding of HEP/Ed previously given.     Treatment Plan:  [x] Therapeutic Exercise   09758  [] Iontophoresis: 4 mg/mL Dexamethasone Sodium Phosphate  mAmin  32095   [] Therapeutic Activity  75913 [x] Vasopneumatic cold with compression  53259    [x] Gait Training   77439 [] Ultrasound   30525   [x] Neuromuscular Re-education  50574 [] Electrical Stimulation Unattended  60556   [x] Manual Therapy  44341 [] Electrical Stimulation Attended  57523   [x] Instruction in HEP  [] Lumbar/Cervical Traction  01533   [] Aquatic Therapy   69782 [x] Cold/hotpack    [] Massage   55900      [] Dry Needling, 1 or 2 muscles  84511   [] Biofeedback, first 15 minutes   90912  [] Biofeedback, additional 15 minutes   90913 [] Dry Needling, 3 or more muscles  20561     []  Medication allergies reviewed for use of    Dexamethasone Sodium Phosphate 4mg/ml     with iontophoresis treatments. Pt is not allergic. Frequency:  1 x/week for 10 visits        Todays Treatment:  Modalities:   Precautions:  Exercises:  Exercise Reps/ Time Weight/ Level Comments   Manual therapy 20 min  - myofascial release to bilateral pectineus, iliacus, psoas and piriformis         Bridge + band circuit 4x5 ea Lime          Clamshells (90 deg hip flexion) 2x10 ea Lime    Sidelying hip abd x10 ea           Prone hip ext x10 ea 2 pillows          Kneeling hip flexor stretch x60\" ea           Other:    Specific Instructions for next treatment: continue manual therapy as needed, progress hip and glute strengthening and progress to more dynamic activities and appropriate while monitoring for compensatory patterns, run analysis when appropriate      Evaluation Complexity:  History (Personal factors, comorbidities) [] 0 [x] 1-2 [] 3+   Exam (limitations, restrictions) [] 1-2 [x] 3 [] 4+   Clinical presentation (progression) [x] Stable [] Evolving  [] Unstable   Decision Making [x] Low [] Moderate [] High    [x] Low Complexity [] Moderate Complexity [] High Complexity       Treatment Charges: Mins Units   [x] Evaluation       [x]  Low       []  Moderate       []  High 20 1   []  Modalities     [x]  Ther Exercise 15 1   [x]  Manual Therapy 20 1   []  Ther Activities     []  Aquatics     []  Vasocompression     []  Other       TOTAL TREATMENT TIME: 55    Time in: 6:00 pm   Time Out: 6:55 pm    Electronically signed by: Vivien Calderon PT        Physician Signature:________________________________Date:__________________  By signing above or cosigning this note, I have reviewed this plan of care and certify a need for medically necessary rehabilitation services.      *PLEASE SIGN ABOVE AND FAX BACK ALL PAGES* Walk in

## 2022-03-16 ENCOUNTER — HOSPITAL ENCOUNTER (OUTPATIENT)
Dept: PHYSICAL THERAPY | Facility: CLINIC | Age: 42
Setting detail: THERAPIES SERIES
Discharge: HOME | End: 2022-03-16
Payer: COMMERCIAL

## 2022-03-16 PROCEDURE — 97110 THERAPEUTIC EXERCISES: CPT

## 2022-03-16 PROCEDURE — 97116 GAIT TRAINING THERAPY: CPT

## 2022-03-16 NOTE — FLOWSHEET NOTE
[] [facility]) - [facility] &  Therapy  [address]  [phone]  F: [phone] [x] [address]  [facility]   [address]  P: [phone]  F: [phone] [] [address] &  Therapy  [address]  P: [phone]  F: [phone] [] [address]  P: [phone]  F: [phone] [] [address]  [facility]   [address]   [place]: [phone]  F: [phone]      Physical Therapy Daily Treatment Note    Date:  3/16/2022  Patient Name:  Key Herbert    :  1980  MRN: [id number]  Physician: Dr. Gerard Rock MD                                 Insurance: [employer] (40 Vs, HARD MAX, benefit period 2021-2022)  Medical Diagnosis: M25.559 - hip pain                    Rehab Codes: M25.551, M25.552, M62.81  Onset date: 2022 referral                      Next 's appt. : 2022  Visit# / total visits: 2/10     Cancels/No Shows: 0/1    Subjective:    Pain:  [] Yes  [x] No Location: bilateral hips Pain Rating: (0-10 scale) 0/10  Pain altered Tx:  [x] No  [] Yes  Action:  Comments: Pt reports that she has not had any hip pain since being seen last, she does noted some R lateral hip soreness. Pt reports that she has not tried to run.      Objective:  Modalities:   Precautions:  Exercises:  Exercise Reps/ Time Weight/ Level Comments   True bike 5 min           Manual therapy 5 min   - myofascial release to R lateral piriformis, glute med and TFL             Bridge + band circuit 4x5 ea Lime               Clamshells (90 deg hip flexion) 2x10 ea Lime     Sidelying hip abd 2x10 ea Lime                Prone hip ext 2x10 ea 2 pillows           Quadruped hip ext 2x10 ea     Quadruped donkey kick 2x10 ea               Kneeling hip flexor stretch x60\" ea                 Lateral monster walks 4x25'  Lime    Simulated running heel taps 2x10 ea  4\"    Lateral heel taps 2x10 ea 4\"          Treadmill 9 min 2' run (4.5-9 mph), 1' walk (2.5 mph) all at 0.5% incline Mandy of approximately 152-156 spm, pt increased mph by 0.2 with each run rep   Other:     Specific Instructions for next treatment: continue manual therapy as needed, progress hip and glute strengthening and progress to more dynamic activities and appropriate while monitoring for compensatory patterns, run analysis when appropriate          Treatment Charges: Mins Units   []  Modalities     [x]  Ther Exercise 26 2   [x]  Manual Therapy 5 --   []  Ther Activities     []  Aquatics     []  Vasocompression     [x]  Other: gait 9  1   Total Treatment time 40 3       Assessment: [x] Progressing toward goals. Minimal time spent on manual therapy at this date due to pt demonstrating improved mobility. Progression of exercises with focus on improving glute strength and dynamic control. Intermittent cues provided to decrease compensatory patterns. Cues required to prevent valgus collapse with greater difficulty on the R LE. Assessed running at this date with pt able to run without increased pain. Mandy maintained at 152-156 spm. Will perform formal run analysis next week when pt has normal running shoes with her.     [] No change. [] Other:  [x] Patient would continue to benefit from skilled physical therapy services in order to: restore ROM and strength in order to improve dynamic control and normalize running gait in order to return to running with decreased pain and difficulty. STG: (to be met in 5 treatments)  1. ? Pain: Pt will report bilateral hip pain of <3/10 with progression of dynamic PT exercises and return to running. 2. ? ROM: Pt will demonstrate ability to maintain full bilateral LE AROM in order to normalize gait mechanics.   3. ? Strength: Pt will increase bilateral hip and glute strength to 5/5 globally in order to improve control and positioning with dynamic tasks. 4. ? Function: Pt will demonstrate ability to perform SL standing without compensatory patterns noted in order to improve tolerance to weight bearing activities with improved symmetry and weight distribution. 5. Pt will demonstrate ability to perform run gait analysis without increased pain or difficulty. 6. Patient to be independent with home exercise program as demonstrated by performance with correct form without cues. LTG: (to be met in 10 treatments)  1. Pt will demonstrate ability to run for 5-10 minutes without significant gait abnormalities and without increased pain levels noted in order to return to normal training program.  2. Pt will demonstrate ability to perform 10x heel taps from a 4\" step with good depth and control and minimal valgus collapse in order to improve positioning with dynamic SL activities. 3. Pt will demonstrate improved functional activity tolerance as evident by an improved score on the LEFI to <15% functional impairment.                    Patient goals: \"to be able to run pain-free\"     Pt. Education:  [x] Yes  [] No  [x] Reviewed Prior HEP/Ed  Method of Education: [x] Verbal  [x] Demo  [x] Written  Access Code: [email or web address]  URL: [employer]/  Date: 03/16/2022  Prepared by: David Rankin    Exercises  Bridge + Band Circuit - 1-2 x daily - 7 x weekly - 4 sets - 5 reps  Clam with Resistance - 1-2 x daily - 7 x weekly - 2 sets - 10 reps  Sidelying Hip Extension in Abduction - 1-2 x daily - 7 x weekly - 2 sets - 10 reps  Prone Hip Extension with Bent Knee - Two Pillows - 1-2 x daily - 7 x weekly - 2 sets - 10 reps  Quadruped Hip Extension Kicks - 1 x daily - 4 x weekly - 2 sets - 10 reps  Quadruped Fire Hydrant - 1 x daily - 4 x weekly - 2 sets - 10 reps  Half Kneeling Hip Flexor Stretch - 1-2 x daily - 7 x weekly - 1 sets - 1 reps - 60 hold  Standing Bilateral Gastroc Stretch with Step - 1-2 x daily - 7 x weekly - 1 sets - 1 reps - 60 hold  Lateral Monster Walk with Resistance at Feet - 1 x daily - 4 x weekly - 2 sets - 10 reps  Lateral Heel Tap - 1 x daily - 4 x weekly - 2 sets - 10 reps       Comprehension of Education:  [x] Verbalizes understanding. [x] Demonstrates understanding. [x] Needs review. [x] Demonstrates/verbalizes HEP/Ed previously given. Plan: [x] Continue current frequency toward long and short term goals.     [x] Specific Instructions for subsequent treatments: run analysis      Time In: 6:04 pm            Time Out: 6:50 pm    Electronically signed by:  Sofia Ovalles, PT

## 2022-03-23 ENCOUNTER — HOSPITAL ENCOUNTER (OUTPATIENT)
Dept: PHYSICAL THERAPY | Facility: CLINIC | Age: 42
Setting detail: THERAPIES SERIES
Discharge: HOME | End: 2022-03-23
Payer: COMMERCIAL

## 2022-03-23 PROCEDURE — 97116 GAIT TRAINING THERAPY: CPT

## 2022-03-23 PROCEDURE — 97110 THERAPEUTIC EXERCISES: CPT

## 2022-03-23 NOTE — FLOWSHEET NOTE
[] [facility]) [facility] &  Therapy  [address]  [phone]  F: [phone] [x] [address]  [facility]   [address]  P: [phone]  F: [phone] [] [address] &  Therapy  [address]  P: [phone]  F: [phone] [] [address]  P: [phone]  F: [phone] [] [address]  [facility]   [address]   [place]: [phone]  F: [phone]      Physical Therapy Daily Treatment Note    Date:  3/23/2022  Patient Name:  Shanta Conley    :  1980  MRN: [id number]  Physician: Dr. Lopez Perez MD                                 Insurance: [employer] (40 Vs, HARD MAX, benefit period 2021-2022)  Medical Diagnosis: M25.559 - hip pain                    Rehab Codes: M25.551, M25.552, M62.81  Onset date: 2022 referral                      Next 's appt. : 2022  Visit# / total visits: 3/10     Cancels/No Shows: 0/1    Subjective:    Pain:  [] Yes  [x] No Location: bilateral hips Pain Rating: (0-10 scale) 0/10  Pain altered Tx:  [x] No  [] Yes  Action:  Comments: Pt arrives reporting that she has been feeling great. She reports being able to do 15 minutes of a walk/run yesterday without increased hip pain or tightness.      Objective:  Modalities:   Precautions:  Exercises:  Exercise Reps/ Time Weight/ Level Completed 22 Comments   True bike 5 min  x           Manual therapy 5 min    - myofascial release to R lateral piriformis, glute med and TFL              Bridge + band circuit 4x5 ea Lime x                Clamshells (90 deg hip flexion) 2x10 ea Lime x     Sidelying hip abd 2x10 ea Lime  x                Prone hip ext 2x10 ea 2 pillows x            Quadruped firehydrant 2x10 ea  x    Quadruped donkey kick 2x10 ea x               Kneeling hip flexor stretch x60\" ea   x                Lateral monster walks 4x25'  Lime x    Monster walks (fwd, retro) 4x25' Lime x Added 3/23   Hip circles 2x10 Lime x Added 3/23   Simulated running heel taps 2x10 ea  4\" x    Lateral heel taps 2x10 ea 4\" x    Walking lunge 4x25'  x Added 3/23   TRX squats 2x10  x Added 3/23          Treadmill 12 min 2x 5' run (5.0 mph), 1' walk (2.5 mph) all at 0.5% incline x Mandy maintained at 156 spm this date   Other:     Specific Instructions for next treatment: continue manual therapy as needed, progress hip and glute strengthening and progress to more dynamic activities and appropriate while monitoring for compensatory patterns, run analysis when appropriate    Video Run Analysis   Warm up: 2 min   Pace: 10:55 min/mile   Duration: 8 minutes    Shoes: Ga Adrenaline   Mandy: 156 spm    Frontal plane deviations:    Dynamic genu valgus    Contralateral pelvic drop       Sagittal plane deviations:     Increased near full knee extension at initial contact    Elevated foot ground angle at initial contact    Knees extend over toes at midstance      Recommendations:     \"Run on toes\"            Treatment Charges: Mins Units   []  Modalities     [x]  Ther Exercise 30 2   []  Manual Therapy     []  Ther Activities     []  Aquatics     []  Vasocompression     [x]  Other: gait 12 1   Total Treatment time 42 3       Assessment: [x] Progressing toward goals. Progressed exercises as noted above with continued focus on improving dynamic stability and control. Intermittent cues given for proper knee positioning. Impaired valgus control evident on the L LE during today's treatment session. Run analysis performed at this date. Pt given cue to \"run on toes\" during second repetition with pt demonstrating improved ground contact ankle and decreased knee near ext on initial contact. Pt with no reports of increased hip pain throughout today's treatment session.     [] No change. [] Other:  [x] Patient would continue to benefit from skilled physical therapy services in order to: restore ROM and strength in order to improve dynamic control and normalize running gait in order to return to running with decreased pain and difficulty. STG: (to be met in 5 treatments)  1. ? Pain: Pt will report bilateral hip pain of <3/10 with progression of dynamic PT exercises and return to running. 2. ? ROM: Pt will demonstrate ability to maintain full bilateral LE AROM in order to normalize gait mechanics. 3. ? Strength: Pt will increase bilateral hip and glute strength to 5/5 globally in order to improve control and positioning with dynamic tasks. 4. ? Function: Pt will demonstrate ability to perform SL standing without compensatory patterns noted in order to improve tolerance to weight bearing activities with improved symmetry and weight distribution. 5. Pt will demonstrate ability to perform run gait analysis without increased pain or difficulty. 6. Patient to be independent with home exercise program as demonstrated by performance with correct form without cues. LTG: (to be met in 10 treatments)  1. Pt will demonstrate ability to run for 5-10 minutes without significant gait abnormalities and without increased pain levels noted in order to return to normal training program.  2. Pt will demonstrate ability to perform 10x heel taps from a 4\" step with good depth and control and minimal valgus collapse in order to improve positioning with dynamic SL activities. 3. Pt will demonstrate improved functional activity tolerance as evident by an improved score on the LEFI to <15% functional impairment.                    Patient goals: \"to be able to run pain-free\"     Pt. Education:  [x] Yes  [] No  [x] Reviewed Prior HEP/Ed  Method of Education: [x] Verbal  [x] Demo  [] Written  Access Code: [email or web address]  URL: [email or web address]  Date: 03/16/2022  Prepared by: Loreta Solo Polatas    Exercises  Bridge + Band Circuit - 1-2 x daily - 7 x weekly - 4 sets - 5 reps  Clam with Resistance - 1-2 x daily - 7 x weekly - 2 sets - 10 reps  Sidelying Hip Extension in Abduction - 1-2 x daily - 7 x weekly - 2 sets - 10 reps  Prone Hip Extension with Bent Knee - Two Pillows - 1-2 x daily - 7 x weekly - 2 sets - 10 reps  Quadruped Hip Extension Kicks - 1 x daily - 4 x weekly - 2 sets - 10 reps  Quadruped Fire Hydrant - 1 x daily - 4 x weekly - 2 sets - 10 reps  Half Kneeling Hip Flexor Stretch - 1-2 x daily - 7 x weekly - 1 sets - 1 reps - 60 hold  Standing Bilateral Gastroc Stretch with Step - 1-2 x daily - 7 x weekly - 1 sets - 1 reps - 60 hold  Lateral Monster Walk with Resistance at Feet - 1 x daily - 4 x weekly - 2 sets - 10 reps  Lateral Heel Tap - 1 x daily - 4 x weekly - 2 sets - 10 reps       Comprehension of Education:  [x] Verbalizes understanding. [x] Demonstrates understanding. [x] Needs review. [x] Demonstrates/verbalizes HEP/Ed previously given. Plan: [x] Continue current frequency toward long and short term goals.     [x] Specific Instructions for subsequent treatments: run analysis      Time In: 6:00 pm            Time Out: 6:49 pm    Electronically signed by:  Katelyn Mcfarland, PT

## 2022-03-30 ENCOUNTER — HOSPITAL ENCOUNTER (OUTPATIENT)
Dept: PHYSICAL THERAPY | Facility: CLINIC | Age: 42
Setting detail: THERAPIES SERIES
Discharge: HOME | End: 2022-03-30
Payer: COMMERCIAL

## 2022-03-30 PROCEDURE — 97110 THERAPEUTIC EXERCISES: CPT

## 2022-03-30 NOTE — FLOWSHEET NOTE
[] [facility]) - [facility] &  Therapy  [address]  [phone]  F: [phone] [x] [address]  [facility]   [address]  P: [phone]  F: [phone] [] [address] &  Therapy  [address]  P: [phone]  F: [phone] [] [address]  P: [phone]  F: [phone] [] [address]  [facility]   [address]   [place]: [phone]  F: [phone]      Physical Therapy Daily Treatment Note    Date:  3/30/2022  Patient Name:  Martha Almendarez    :  1980  MRN: [id number]  Physician: Dr. Marcelino Martínez MD                                 Insurance: [employer] (40 Vs, HARD MAX, benefit period 2021-2022)  Medical Diagnosis: M25.559 - hip pain                    Rehab Codes: M25.551, M25.552, M62.81  Onset date: 2022 referral                      Next 's appt. : 2022  Visit# / total visits: 410     Cancels/No Shows: 0/1    Subjective:    Pain:  [] Yes  [x] No Location: bilateral hips Pain Rating: (0-10 scale) 0/10  Pain altered Tx:  [x] No  [] Yes  Action:   Comments: Pt reports running since last time and she states it felt great. She no longer has the hip pain. She states she is still is unsure if she is performing the cues from last time correctly and the new positioning still feels unnatural. Pt reports no pain at any point anymore, just hip tightness after prolonged sitting.      Objective:  Modalities:   Precautions:  Exercises:  Exercise Reps/ Time Weight/ Level Completed 22 Comments   True bike 5 min  x           Manual therapy 5 min    - myofascial release to R lateral piriformis, glute med and TFL              Bridge + band circuit 4x5 ea Lime x                Clamshells (90 deg hip flexion) 2x10 ea blue x  inc 3/30   Sidelying hip abd 2x10 ea blue  x  inc 3/30              Prone hip ext 2x10 ea 2 pillows x            Quadruped firehydrant 2x10 ea  x    Quadruped donkey kick 2x10 ea  x               Kneeling hip flexor stretch x60\" ea   x                Lateral monster walks 4x25'  blue x Inc 3/30   Monster walks (fwd, retro) 4x25' blue x Added 3/23, inc 3/30   Hip circles 2x10 blue x Added 3/23, inc 3/30   Simulated running heel taps 2x10 ea  4\" x    Lateral heel taps 2x10 ea 4\" x    Walking lunge 4x25'  x Added 3/23   TRX squats 2x10  x Added 3/23          Treadmill 13 min 2x 5' run (5.0 mph), 1' walk (2.5 mph) all at 0.5% incline x Mandy maintained at 156 spm this date   Other:     Specific Instructions for next treatment: continue manual therapy as needed, progress hip and glute strengthening and progress to more dynamic activities and appropriate while monitoring for compensatory patterns, run analysis when appropriate    Video Run Analysis   Warm up: 2 min   Pace: 10:55 min/mile   Duration: 8 minutes    Shoes: Ga Adrenaline   Mandy: 156 spm    Frontal plane deviations:    Dynamic genu valgus    Contralateral pelvic drop       Sagittal plane deviations:     Increased near full knee extension at initial contact    Elevated foot ground angle at initial contact    Knees extend over toes at midstance      Recommendations:     \"Run on toes\"            Treatment Charges: Mins Units   []  Modalities     [x]  Ther Exercise 40 3   []  Manual Therapy     []  Ther Activities     []  Aquatics     []  Vasocompression     [x]  Other: gait 5 --   Total Treatment time 45 3       Assessment: [x] Progressing toward goals. Initiated session with bike for warm up. Completed exercises as written above. Pt was challenged by progressing to a blue TB. Pt demonstrates improved valgus control with step exercises. Pt was cued to avoid valgus collapse with lunges.  Pt reports a pulling in the medial R knee at the conclusion of lunges but had better valgus control toward the end. Pt reports no knee pain with squats following lunges. While she was on a run this morning she had R calf soreness and continues to have that sensation 2 minutes into today's run during the session. Pt was educated to continue calf stretch before and after runs and that the alteration in her contact pattern is going to work her calf more initially. Pt reports no increase in pain after session today. [] No change. [] Other:  [x] Patient would continue to benefit from skilled physical therapy services in order to: restore ROM and strength in order to improve dynamic control and normalize running gait in order to return to running with decreased pain and difficulty. STG: (to be met in 5 treatments)  1. ? Pain: Pt will report bilateral hip pain of <3/10 with progression of dynamic PT exercises and return to running. 2. ? ROM: Pt will demonstrate ability to maintain full bilateral LE AROM in order to normalize gait mechanics. 3. ? Strength: Pt will increase bilateral hip and glute strength to 5/5 globally in order to improve control and positioning with dynamic tasks. 4. ? Function: Pt will demonstrate ability to perform SL standing without compensatory patterns noted in order to improve tolerance to weight bearing activities with improved symmetry and weight distribution. 5. Pt will demonstrate ability to perform run gait analysis without increased pain or difficulty. 6. Patient to be independent with home exercise program as demonstrated by performance with correct form without cues. LTG: (to be met in 10 treatments)  1.  Pt will demonstrate ability to run for 5-10 minutes without significant gait abnormalities and without increased pain levels noted in order to return to normal training program.  2. Pt will demonstrate ability to perform 10x heel taps from a 4\" step with good depth and control and minimal valgus collapse in order to improve positioning with

## 2022-04-06 ENCOUNTER — HOSPITAL ENCOUNTER (OUTPATIENT)
Dept: PHYSICAL THERAPY | Facility: CLINIC | Age: 42
Setting detail: THERAPIES SERIES
Discharge: HOME | End: 2022-04-06
Payer: COMMERCIAL

## 2022-04-06 PROCEDURE — 97110 THERAPEUTIC EXERCISES: CPT

## 2022-04-06 NOTE — FLOWSHEET NOTE
[] [facility]) - [facility] &  Therapy  [address]  [phone]  F: [phone] [x] [address]  [place] [address]   [address]  P: [phone]  F: [phone] [] [address] &  Therapy  [address]  P: [phone]  F: [phone] [] [address]  P: [phone]  F: [phone] [] [address]  [facility]   [address]   [place]: [phone]  F: [phone]      Physical Therapy Daily Treatment Note    Date:  2022  Patient Name:  Miquel Mejia    :  1980  MRN: [id number]  Physician: Dr. Kimberly Morel MD                                 Insurance: [employer] (40 Vs, HARD MAX, benefit period 2021-2022)  Medical Diagnosis: M25.559 - hip pain                    Rehab Codes: M25.551, M25.552, M62.81  Onset date: 2022 referral                      Next 's appt. : 2022  Visit# / total visits: 5/10     Cancels/No Shows: 0/1    Subjective:    Pain:  [] Yes  [x] No Location: bilateral hips Pain Rating: (0-10 scale) 0/10  Pain altered Tx:  [x] No  [] Yes  Action:   Comments: Pt arrives without complaints of pain, she reports that she has been able to run a few times over the past week without issue.      Objective:  Modalities:   Precautions:  Exercises:  Exercise Reps/ Time Weight/ Level Completed 22 Comments   True bike 5 min  x           Manual therapy 5 min    - myofascial release to R lateral piriformis, glute med and TFL              Bridge + band circuit 4x5 ea Blue x                Clamshells (90 deg hip flexion) 2x10 ea blue x  inc 3/30   Sidelying hip abd 2x10 ea blue  x  inc 3/30              Prone hip ext 2x10 ea 2 pillows x            Quadruped firehydrant 2x10 ea lime x Added resistance 4/6   Quadruped donkey kick 2x10 ea lime x Added resistance 4/6              Kneeling hip flexor stretch x60\" ea   x                Lateral monster walks 4x25'  blue x Inc 3/30   Monster walks (fwd, retro) 4x25' blue x Added 3/23, inc 3/30   Hip circles 2x10 blue  Added 3/23, inc 3/30   Standing firehydrants 2x10 ea blue x Added 4/6   Simulated running heel taps 2x10 ea  4\"     Forward heel taps 2x10 ea 6\" x Added 4/6   Lateral heel taps 2x10 ea 6\" x Increased step height 4/6   Walking lunge 4x25'  x Added 3/23   TRX squats 2x10  x Added 3/23          Treadmill 13 min 2x 5' run (5.0 mph), 1' walk (2.5 mph) all at 0.5% incline  Mandy maintained at 156 spm this date   Other:     Specific Instructions for next treatment: continue manual therapy as needed, progress hip and glute strengthening and progress to more dynamic activities and appropriate while monitoring for compensatory patterns, run analysis when appropriate    Video Run Analysis   Warm up: 2 min   Pace: 10:55 min/mile   Duration: 8 minutes    Shoes: Ga Adrenaline   Mandy: 156 spm    Frontal plane deviations:    Dynamic genu valgus    Contralateral pelvic drop       Sagittal plane deviations:     Increased near full knee extension at initial contact    Elevated foot ground angle at initial contact    Knees extend over toes at midstance      Recommendations:     \"Run on toes\"      4/6/2022 ROM  ° A/P STRENGTH     Left Right Left Right   Hip Flex     5/5 5/5   Ext  WFL WFL 5/5 5/5   ER           IR           ABD     4+/5 5/5   ADD           Knee Flex     5/5 5/5   Ext     5/5 5/5   Ankle DF (knee straight)     5/5 5/5           Treatment Charges: Mins Units   []  Modalities     [x]  Ther Exercise 43 3   []  Manual Therapy     []  Ther Activities     []  Aquatics     []  Vasocompression     []  Other: gait     Total Treatment time 43 3       Assessment: [x] Progressing toward goals. Exercises performed as charted above.  Pt continues to demonstrate improved form and tolerance to exercises with decreased fatigue and need for cues to prevent excessive anterior translation/valgus collapse throughout treatment session. Exercises progressed as charted above with good tolerance to all progressions. Plan to hold PT following today's treatment session to trial independent HEP. Pt will call to reschedule if she experiences any increased pain or difficulty over the next few weeks. [] No change. [] Other:  [x] Patient would continue to benefit from skilled physical therapy services in order to: restore ROM and strength in order to improve dynamic control and normalize running gait in order to return to running with decreased pain and difficulty. STG: (to be met in 5 treatments)  1. ? Pain: Pt will report bilateral hip pain of <3/10 with progression of dynamic PT exercises and return to running. - MET 4/6/2022   2. ? ROM: Pt will demonstrate ability to maintain full bilateral LE AROM in order to normalize gait mechanics. - MET 4/6/2022   3. ? Strength: Pt will increase bilateral hip and glute strength to 5/5 globally in order to improve control and positioning with dynamic tasks. - Progress made 4/6/2022, met for all except L hip abduction 4+/5  4. ? Function: Pt will demonstrate ability to perform SL standing without compensatory patterns noted in order to improve tolerance to weight bearing activities with improved symmetry and weight distribution. - MET 4/6/2022  5. Pt will demonstrate ability to perform run gait analysis without increased pain or difficulty. - MET 4/6/2022   6. Patient to be independent with home exercise program as demonstrated by performance with correct form without cues. - MET 4/6/2022   LTG: (to be met in 10 treatments)  1. Pt will demonstrate ability to run for 5-10 minutes without significant gait abnormalities and without increased pain levels noted in order to return to normal training program. - MET 4/6/2022   2.  Pt will demonstrate ability to perform 10x heel taps from a 4\" step with good depth and control and minimal valgus collapse in order to improve positioning with dynamic SL activities. - MET 4/6/2022, pt able to perform on 6\" step  3. Pt will demonstrate improved functional activity tolerance as evident by an improved score on the LEFI to <15% functional impairment. - MET 4/6/2022, currently 0% functional impairment (80/80)                    Patient goals: \"to be able to run pain-free\"     Pt. Education:  [x] Yes  [] No  [x] Reviewed Prior HEP/Ed  Method of Education: [x] Verbal  [x] Demo  [x] Written  Access Code: [email or web address]  URL: [email or web address]  Date: 04/06/2022  Prepared by: Paulette Braun     Exercises  Bridge + Band Circuit - 1-2 x daily - 7 x weekly - 4 sets - 5 reps  Clam with Resistance - 1-2 x daily - 7 x weekly - 2 sets - 10 reps  Sidelying Hip Extension in Abduction - 1-2 x daily - 7 x weekly - 2 sets - 10 reps  Prone Hip Extension with Bent Knee - Two Pillows - 1-2 x daily - 7 x weekly - 2 sets - 10 reps  Quadruped Hip Extension Kicks - 1 x daily - 4 x weekly - 2 sets - 10 reps  Quadruped Fire Hydrant - 1 x daily - 4 x weekly - 2 sets - 10 reps  Half Kneeling Hip Flexor Stretch - 1-2 x daily - 7 x weekly - 1 sets - 1 reps - 60 hold  Standing Bilateral Gastroc Stretch with Step - 1-2 x daily - 7 x weekly - 1 sets - 1 reps - 60 hold  Lateral Monster Walk with Resistance at Feet - 1 x daily - 4 x weekly - 2 sets - 10 reps  Lateral Heel Tap - 1 x daily - 4 x weekly - 2 sets - 10 reps  Slider (Forward, Lateral, Retro) - 1 x daily - 4 x weekly - 2 sets - 10 reps  Walking Forward Lunge - 1 x daily - 4 x weekly - 2 sets - 10 reps  Forward Monster Walks - 1 x daily - 4 x weekly - 2 sets - 10 reps  Backward Monster Walks - 1 x daily - 4 x weekly - 2 sets - 10 reps  Standing Hip Abduction with Bent Knee - 1 x daily - 4 x weekly - 2 sets - 10 reps       Comprehension of Education:  [x] Verbalizes understanding. [x] Demonstrates understanding.   [x] Needs review. [x] Demonstrates/verbalizes HEP/Ed previously given. Plan: [x] Continue current frequency toward long and short term goals.     [x] Specific Instructions for subsequent treatments: run analysis      Time In: 6:01 pm            Time Out: 6:51 pm    Electronically signed by:  Shruthi Veloz PT

## 2022-05-18 NOTE — DISCHARGE SUMMARY
[] [facility]) [facility] &  Therapy  [address]  [phone]  F: [phone] [x] [address]  [facility]   [address]  P: [phone]  F: [phone] [] [address]  [address]  P: [phone]  F: [phone] [] [address]  P: [phone]  F: [phone] [] [address]  [address] [facility] [address]   [address]   [place]: [phone]  F: [phone]      Physical Therapy Discharge Note    Date: 2022      Patient: Mary Reeves  : 1980  MRN: [id number]    Physician: Dr. Vicente Walker MD                                 Insurance: [employer] (40 Vs, HARD MAX, benefit period 2021-2022)  Medical Diagnosis: M25.559 - hip pain                    Rehab Codes: M25.551, M25.552, M62.81  Onset date: 2022 referral                      Next 's appt. : 2022  Visit# / total visits: 5/10                                  Cancels/No Shows: 0/1  Date of initial visit: 3/2/2022                Date of final visit: 2022      Subjective:  Pain:  []? Yes  [x]? No   Location: bilateral hips            Pain Rating: (0-10 scale) 0/10  Pain altered Tx:  [x]? No  []?  Yes  Action:   Comments: Pt arrives without complaints of pain, she reports that she has been able to run a few times over the past week without issue.        Objective:  2022 ROM  ° A/P STRENGTH     Left Right Left Right   Hip Flex     5/5 5/5   Ext  WFL WFL 5/5 5/5   ER           IR           ABD     4+/5 5/5   ADD           Knee Flex     5/5 5/5   Ext     5/5 5/5   Ankle DF (knee straight)     5/5 5/5               Assessment:  PT placed on hold following last treatment session for patient to perform HEP and slowly progress running and call to reschedule any additional treatments if she had increased pain or difficulty. Pt did not call to reschedule and is discharged at this time. Pt demonstrating significant improvements throughout PT intervention with improvements in strength and dynamic control and ability to return to running without issue. Excellent prognosis with continuation of HEP and slow build up of consistent running. STG: (to be met in 5 treatments)  1. ? Pain: Pt will report bilateral hip pain of <3/10 with progression of dynamic PT exercises and return to running. - MET 4/6/2022   2. ? ROM: Pt will demonstrate ability to maintain full bilateral LE AROM in order to normalize gait mechanics. - MET 4/6/2022   3. ? Strength: Pt will increase bilateral hip and glute strength to 5/5 globally in order to improve control and positioning with dynamic tasks. - Progress made 4/6/2022, met for all except L hip abduction 4+/5  4. ? Function: Pt will demonstrate ability to perform SL standing without compensatory patterns noted in order to improve tolerance to weight bearing activities with improved symmetry and weight distribution. - MET 4/6/2022  5. Pt will demonstrate ability to perform run gait analysis without increased pain or difficulty. - MET 4/6/2022   6. Patient to be independent with home exercise program as demonstrated by performance with correct form without cues. - MET 4/6/2022   LTG: (to be met in 10 treatments)  1. Pt will demonstrate ability to run for 5-10 minutes without significant gait abnormalities and without increased pain levels noted in order to return to normal training program. - MET 4/6/2022   2. Pt will demonstrate ability to perform 10x heel taps from a 4\" step with good depth and control and minimal valgus collapse in order to improve positioning with dynamic SL activities. - MET 4/6/2022, pt able to perform on 6\" step  3.  Pt will demonstrate improved functional activity tolerance as evident by an improved score on the LEFI to <15% functional impairment. - MET 4/6/2022, currently 0% functional impairment (80/80)                    Patient goals: \"to be able to run pain-free\"     Treatment to Date:  [x] Therapeutic Exercise    [] Modalities:  [] Therapeutic Activity    [] Ultrasound  [] Electrical Stimulation  [x] Gait Training     [] Massage       [] Lumbar/Cervical Traction  [x] Neuromuscular Re-education [] Cold/hotpack [] Iontophoresis: 4 mg/mL  [x] Instruction in Home Exercise Program                     Dexamethasone Sodium  [x] Manual Therapy             Phosphate 40-80 mAmin  [] Aquatic Therapy                   [] Vasocompression/    [] Other:             Game Ready    Discharge Status:     [x] Pt recovered from conditions. Treatment goals were met. [x] Pt received maximum benefit. No further therapy indicated at this time. [x] Pt to continue exercise/home instructions independently. [] Therapy interrupted due to:    [] Pt has 2 or more no shows/cancels, is discontinued per our policy. [] Pt has completed prescribed number of treatment sessions. [] Other:         Electronically signed by Tito Blanca PT on 5/18/2022 at 12:25 PM      If you have any questions or concerns, please don't hesitate to call.   Thank you for your referral.

## 2022-10-11 ENCOUNTER — OFFICE VISIT (OUTPATIENT)
Dept: FAMILY MEDICINE CLINIC | Age: 42
End: 2022-10-11
Payer: COMMERCIAL

## 2022-10-11 VITALS
OXYGEN SATURATION: 100 % | SYSTOLIC BLOOD PRESSURE: 86 MMHG | HEART RATE: 49 BPM | BODY MASS INDEX: 19.02 KG/M2 | WEIGHT: 128.4 LBS | TEMPERATURE: 97.5 F | DIASTOLIC BLOOD PRESSURE: 51 MMHG | HEIGHT: 69 IN

## 2022-10-11 DIAGNOSIS — E53.8 B12 DEFICIENCY: Primary | ICD-10-CM

## 2022-10-11 DIAGNOSIS — D64.89 ANEMIA DUE TO OTHER CAUSE, NOT CLASSIFIED: ICD-10-CM

## 2022-10-11 DIAGNOSIS — E55.9 VITAMIN D DEFICIENCY: ICD-10-CM

## 2022-10-11 PROCEDURE — 99214 OFFICE O/P EST MOD 30 MIN: CPT | Performed by: FAMILY MEDICINE

## 2022-10-11 RX ORDER — CYANOCOBALAMIN/FOLIC ACID 1MG-400MCG
1 TABLET, SUBLINGUAL SUBLINGUAL DAILY
Qty: 90 TABLET | Refills: 5 | Status: SHIPPED | OUTPATIENT
Start: 2022-10-11 | End: 2022-10-12 | Stop reason: CLARIF

## 2022-10-11 RX ORDER — ERGOCALCIFEROL 1.25 MG/1
50000 CAPSULE ORAL WEEKLY
Qty: 12 CAPSULE | Refills: 1 | Status: SHIPPED | OUTPATIENT
Start: 2022-10-11

## 2022-10-11 ASSESSMENT — ENCOUNTER SYMPTOMS
STRIDOR: 0
NAUSEA: 0
SHORTNESS OF BREATH: 0
EYE DISCHARGE: 0
BACK PAIN: 0
ABDOMINAL PAIN: 0
EYE REDNESS: 0
VOMITING: 0
BLOOD IN STOOL: 0
DIARRHEA: 0
COUGH: 0
SORE THROAT: 0
CONSTIPATION: 0
PHOTOPHOBIA: 0
EYE PAIN: 0

## 2022-10-11 ASSESSMENT — ANXIETY QUESTIONNAIRES
5. BEING SO RESTLESS THAT IT IS HARD TO SIT STILL: 0
7. FEELING AFRAID AS IF SOMETHING AWFUL MIGHT HAPPEN: 0
1. FEELING NERVOUS, ANXIOUS, OR ON EDGE: 0
IF YOU CHECKED OFF ANY PROBLEMS ON THIS QUESTIONNAIRE, HOW DIFFICULT HAVE THESE PROBLEMS MADE IT FOR YOU TO DO YOUR WORK, TAKE CARE OF THINGS AT HOME, OR GET ALONG WITH OTHER PEOPLE: NOT DIFFICULT AT ALL
4. TROUBLE RELAXING: 0
GAD7 TOTAL SCORE: 0
2. NOT BEING ABLE TO STOP OR CONTROL WORRYING: 0
6. BECOMING EASILY ANNOYED OR IRRITABLE: 0
3. WORRYING TOO MUCH ABOUT DIFFERENT THINGS: 0

## 2022-10-11 ASSESSMENT — PATIENT HEALTH QUESTIONNAIRE - PHQ9
SUM OF ALL RESPONSES TO PHQ QUESTIONS 1-9: 0
SUM OF ALL RESPONSES TO PHQ QUESTIONS 1-9: 0
1. LITTLE INTEREST OR PLEASURE IN DOING THINGS: 0
SUM OF ALL RESPONSES TO PHQ QUESTIONS 1-9: 0
SUM OF ALL RESPONSES TO PHQ9 QUESTIONS 1 & 2: 0
SUM OF ALL RESPONSES TO PHQ QUESTIONS 1-9: 0
SUM OF ALL RESPONSES TO PHQ QUESTIONS 1-9: 0
2. FEELING DOWN, DEPRESSED OR HOPELESS: 0
1. LITTLE INTEREST OR PLEASURE IN DOING THINGS: 0
2. FEELING DOWN, DEPRESSED OR HOPELESS: 0
SUM OF ALL RESPONSES TO PHQ9 QUESTIONS 1 & 2: 0
SUM OF ALL RESPONSES TO PHQ QUESTIONS 1-9: 0

## 2022-10-11 NOTE — PROGRESS NOTES
Subjective:      Patient ID: Chrystal Jose is a 43 y.o. female. States did not see me after labs. States not seen Gyn in a long time-states H/O heavy periods- Dr Jaqueline Carrillo is her Gyn. States mood, sleep, appetite ok. States bowels and bladder ok. Was having loose stools for a while- 2018 to early 2022, currently doing well. Reason she came in - states may have tonsil stones - states was coughing up a chunk   Here and there- states bad taste in her mouth. States feels self conscious. Review of Systems   Constitutional:  Negative for chills and fever. HENT:  Negative for congestion, ear pain, hearing loss, nosebleeds, sore throat and tinnitus. Eyes:  Negative for photophobia, pain, discharge and redness. Respiratory:  Negative for cough, shortness of breath and stridor. Cardiovascular:  Negative for chest pain, palpitations and leg swelling. Gastrointestinal:  Negative for abdominal pain, blood in stool, constipation, diarrhea, nausea and vomiting. Endocrine: Negative for polydipsia. Genitourinary:  Negative for dysuria, flank pain, frequency, hematuria and urgency. Musculoskeletal:  Negative for back pain, myalgias and neck pain. Skin:  Negative for rash. Allergic/Immunologic: Negative for environmental allergies. Neurological:  Negative for dizziness, tremors, seizures, weakness and headaches. Hematological:  Does not bruise/bleed easily. Psychiatric/Behavioral:  Negative for hallucinations and suicidal ideas. The patient is not nervous/anxious. Objective:   Physical Exam  Constitutional:       General: She is not in acute distress. Appearance: She is well-developed. HENT:      Head: Normocephalic and atraumatic.       Right Ear: External ear normal.      Left Ear: External ear normal.      Nose: Nose normal.      Mouth/Throat:      Mouth: Mucous membranes are moist.   Eyes:      Conjunctiva/sclera: Conjunctivae normal.      Pupils: Pupils are equal, round, and reactive to light. Cardiovascular:      Rate and Rhythm: Normal rate and regular rhythm. Heart sounds: Normal heart sounds. Pulmonary:      Effort: Pulmonary effort is normal.      Breath sounds: Normal breath sounds. Abdominal:      General: Bowel sounds are normal. There is no distension. Palpations: Abdomen is soft. Tenderness: There is no abdominal tenderness. Musculoskeletal:         General: Normal range of motion. Cervical back: Normal range of motion and neck supple. Skin:     General: Skin is warm and dry. Neurological:      General: No focal deficit present. Mental Status: She is alert and oriented to person, place, and time. Mental status is at baseline. Psychiatric:         Behavior: Behavior normal.         Thought Content: Thought content normal.         Judgment: Judgment normal.        Vitals:    10/11/22 1420   BP: (!) 86/51   Pulse: (!) 49   Temp:    SpO2:          Assessment:      Diagnosis Orders   1. B12 deficiency  Cobalamin Combinations (B-12) 1000-400 MCG SUBL      2. Vitamin D deficiency  vitamin D (ERGOCALCIFEROL) 1.25 MG (99379 UT) CAPS capsule      3. Anemia due to other cause, not classified  CBC with Auto Differential    Iron and TIBC    Ferritin            Plan:     Orders Placed This Encounter   Procedures    CBC with Auto Differential    Iron and TIBC    Ferritin       Outpatient Encounter Medications as of 10/11/2022   Medication Sig Dispense Refill    vitamin D (ERGOCALCIFEROL) 1.25 MG (51828 UT) CAPS capsule Take 1 capsule by mouth once a week 12 capsule 1    Cobalamin Combinations (B-12) 1000-400 MCG SUBL Place 1 tablet under the tongue daily 90 tablet 5     No facility-administered encounter medications on file as of 10/11/2022.      20 minutes spent with patient counseling/educating on acute/chronic medical health problems, medications,  along with treatment options. Reviewed multiple labs/imaging/medications with patient during this time. Following Diet discussion/education was done on healthy diet. In addition Exercise plan and depression screen were discussed. Recent labs/imaging were discussed and reviewed with patient.

## 2022-10-12 DIAGNOSIS — E53.8 B12 DEFICIENCY: Primary | ICD-10-CM

## 2022-10-12 RX ORDER — MAGNESIUM 200 MG
1 TABLET ORAL DAILY
Qty: 90 TABLET | Refills: 5 | Status: SHIPPED | OUTPATIENT
Start: 2022-10-12

## 2022-10-13 LAB
ABSOLUTE BASO #: 0.05 K/UL (ref 0–0.2)
ABSOLUTE EOS #: 0.07 K/UL (ref 0–0.5)
ABSOLUTE LYMPH #: 1.75 K/UL (ref 1–4)
ABSOLUTE MONO #: 0.38 K/UL (ref 0.2–1)
ABSOLUTE NEUT #: 1.25 K/UL (ref 1.5–7.5)
BASOPHILS RELATIVE PERCENT: 1.4 %
EOSINOPHILS RELATIVE PERCENT: 2 %
FERRITIN: 8 NG/ML (ref 13–200)
HCT VFR BLD CALC: 34.8 % (ref 34–45)
HEMOGLOBIN: 10.9 G/DL (ref 11.5–15.5)
IRON SATURATION: 16 % (ref 20–50)
IRON, SERUM: 55 UG/DL (ref 37–145)
LYMPHOCYTE %: 50 %
MCH RBC QN AUTO: 23.8 PG (ref 25–33)
MCHC RBC AUTO-ENTMCNC: 31.3 G/DL (ref 31–36)
MCV RBC AUTO: 76 FL (ref 80–99)
MONOCYTES # BLD: 10.9 %
NEUTROPHILS RELATIVE PERCENT: 35.7 %
PDW BLD-RTO: 14.5 % (ref 11.5–15)
PLATELETS: 333 K/UL (ref 130–400)
PMV BLD AUTO: 10.7 FL (ref 9.3–13)
RBC: 4.58 M/UL (ref 3.8–5.4)
TOTAL IRON BINDING CAPACITY: 349 UG/DL (ref 250–450)
UNSATURATED IRON BINDING CAPACITY: 294 UG/DL (ref 112–347)
WBC: 3.5 K/UL (ref 3.5–11)

## 2022-10-20 DIAGNOSIS — E61.1 IRON DEFICIENCY: Primary | ICD-10-CM

## 2022-10-20 RX ORDER — ASCORBIC ACID 250 MG
250 TABLET ORAL DAILY
Qty: 90 TABLET | Refills: 3 | Status: SHIPPED | OUTPATIENT
Start: 2022-10-20

## 2022-10-20 RX ORDER — FERROUS SULFATE 325(65) MG
325 TABLET ORAL
Qty: 90 TABLET | Refills: 1 | Status: SHIPPED | OUTPATIENT
Start: 2022-10-20

## 2023-01-25 ENCOUNTER — TELEMEDICINE (OUTPATIENT)
Dept: FAMILY MEDICINE CLINIC | Age: 43
End: 2023-01-25
Payer: COMMERCIAL

## 2023-01-25 DIAGNOSIS — E53.9 VITAMIN B DEFICIENCY: ICD-10-CM

## 2023-01-25 DIAGNOSIS — E55.9 VITAMIN D DEFICIENCY: ICD-10-CM

## 2023-01-25 DIAGNOSIS — Z13.220 SCREENING, LIPID: ICD-10-CM

## 2023-01-25 DIAGNOSIS — E61.1 IRON DEFICIENCY: ICD-10-CM

## 2023-01-25 DIAGNOSIS — R73.9 HYPERGLYCEMIA: ICD-10-CM

## 2023-01-25 DIAGNOSIS — Z78.9 USES BIRTH CONTROL: ICD-10-CM

## 2023-01-25 DIAGNOSIS — Z13.29 SCREENING FOR THYROID DISORDER: ICD-10-CM

## 2023-01-25 DIAGNOSIS — D64.89 ANEMIA DUE TO OTHER CAUSE, NOT CLASSIFIED: Primary | ICD-10-CM

## 2023-01-25 PROBLEM — R30.0 DYSURIA: Status: RESOLVED | Noted: 2020-03-26 | Resolved: 2023-01-25

## 2023-01-25 PROBLEM — M25.559 HIP PAIN: Status: RESOLVED | Noted: 2022-02-21 | Resolved: 2023-01-25

## 2023-01-25 PROCEDURE — 99214 OFFICE O/P EST MOD 30 MIN: CPT | Performed by: FAMILY MEDICINE

## 2023-01-25 RX ORDER — FERROUS SULFATE 325(65) MG
325 TABLET ORAL
Qty: 90 TABLET | Refills: 0 | Status: SHIPPED | OUTPATIENT
Start: 2023-01-25

## 2023-01-25 RX ORDER — ERGOCALCIFEROL 1.25 MG/1
50000 CAPSULE ORAL WEEKLY
Qty: 12 CAPSULE | Refills: 0 | Status: SHIPPED | OUTPATIENT
Start: 2023-01-25

## 2023-01-25 RX ORDER — NORGESTIMATE AND ETHINYL ESTRADIOL 0.25-0.035
1 KIT ORAL DAILY
COMMUNITY
Start: 2023-01-05 | End: 2023-01-25 | Stop reason: ALTCHOICE

## 2023-01-25 RX ORDER — MAGNESIUM 200 MG
1 TABLET ORAL DAILY
Qty: 90 TABLET | Refills: 5 | Status: SHIPPED | OUTPATIENT
Start: 2023-01-25

## 2023-01-25 RX ORDER — ASCORBIC ACID 250 MG
250 TABLET ORAL DAILY
Qty: 90 TABLET | Refills: 1 | Status: SHIPPED | OUTPATIENT
Start: 2023-01-25

## 2023-01-25 ASSESSMENT — PATIENT HEALTH QUESTIONNAIRE - PHQ9
2. FEELING DOWN, DEPRESSED OR HOPELESS: 0
SUM OF ALL RESPONSES TO PHQ QUESTIONS 1-9: 0
1. LITTLE INTEREST OR PLEASURE IN DOING THINGS: 0
1. LITTLE INTEREST OR PLEASURE IN DOING THINGS: 0
SUM OF ALL RESPONSES TO PHQ9 QUESTIONS 1 & 2: 0
SUM OF ALL RESPONSES TO PHQ QUESTIONS 1-9: 0
2. FEELING DOWN, DEPRESSED OR HOPELESS: 0
SUM OF ALL RESPONSES TO PHQ9 QUESTIONS 1 & 2: 0
SUM OF ALL RESPONSES TO PHQ QUESTIONS 1-9: 0

## 2023-01-25 ASSESSMENT — ANXIETY QUESTIONNAIRES
3. WORRYING TOO MUCH ABOUT DIFFERENT THINGS: 0
GAD7 TOTAL SCORE: 0
5. BEING SO RESTLESS THAT IT IS HARD TO SIT STILL: 0
1. FEELING NERVOUS, ANXIOUS, OR ON EDGE: 0
7. FEELING AFRAID AS IF SOMETHING AWFUL MIGHT HAPPEN: 0
6. BECOMING EASILY ANNOYED OR IRRITABLE: 0
IF YOU CHECKED OFF ANY PROBLEMS ON THIS QUESTIONNAIRE, HOW DIFFICULT HAVE THESE PROBLEMS MADE IT FOR YOU TO DO YOUR WORK, TAKE CARE OF THINGS AT HOME, OR GET ALONG WITH OTHER PEOPLE: NOT DIFFICULT AT ALL
4. TROUBLE RELAXING: 0
2. NOT BEING ABLE TO STOP OR CONTROL WORRYING: 0

## 2023-01-25 ASSESSMENT — ENCOUNTER SYMPTOMS
SHORTNESS OF BREATH: 0
STRIDOR: 0
SORE THROAT: 0
EYE DISCHARGE: 0
CONSTIPATION: 0
PHOTOPHOBIA: 0
EYE PAIN: 0
BACK PAIN: 0
ABDOMINAL PAIN: 0
BLOOD IN STOOL: 0
NAUSEA: 0
VOMITING: 0
DIARRHEA: 0
COUGH: 0
EYE REDNESS: 0

## 2023-01-25 NOTE — PROGRESS NOTES
Subjective:      Patient ID: Carrillo Teague is a 43 y.o. female. VV with patient at home and myself in the office. Has been taking her vitamins and currently out of Vit D as well as iron and vit c. Not on [facility]  States saw GI and was seen by Gyn for vaginosis and yeast infection for which she was treated. States GI issues cleared after taking the vitamin supplements. States was having loose stools for a couple of years- states tried gluten free, increased fiber and iron supplements. States feels a lot better after supplements . 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 Review of Systems   Constitutional:  Negative for chills and fever. HENT:  Negative for congestion, ear pain, hearing loss, nosebleeds, sore throat and tinnitus. Eyes:  Negative for photophobia, pain, discharge and redness. Respiratory:  Negative for cough, shortness of breath and stridor. Cardiovascular:  Negative for chest pain, palpitations and leg swelling. Gastrointestinal:  Negative for abdominal pain, blood in stool, constipation, diarrhea, nausea and vomiting. Endocrine: Negative for polydipsia. Genitourinary:  Negative for dysuria, flank pain, frequency, hematuria and urgency. Musculoskeletal:  Negative for back pain, myalgias and neck pain. Skin:  Negative for rash. Allergic/Immunologic: Negative for environmental allergies. Neurological:  Negative for dizziness, tremors, seizures, weakness and headaches. Hematological:  Does not bruise/bleed easily. Psychiatric/Behavioral:  Negative for hallucinations and suicidal ideas. The patient is not nervous/anxious. Objective:   Physical Exam  Constitutional:       General: She is not in acute distress. Appearance: Normal appearance. She is well-developed. HENT:      Head: Normocephalic and atraumatic. Nose: Nose normal.      Mouth/Throat:      Pharynx: No oropharyngeal exudate. Eyes:      General: No scleral icterus.         Right eye: No discharge. Left eye: No discharge. Extraocular Movements: Extraocular movements intact. Conjunctiva/sclera: Conjunctivae normal.   Neck:      Thyroid: No thyromegaly. Vascular: No JVD. Trachea: No tracheal deviation. Cardiovascular:      Heart sounds: No murmur heard. No friction rub. No gallop. Pulmonary:      Effort: Pulmonary effort is normal.      Breath sounds: No stridor. Chest:      Chest wall: No tenderness. Abdominal:      General: There is no distension. Palpations: Abdomen is soft. Tenderness: There is no abdominal tenderness. Musculoskeletal:         General: No tenderness or deformity. Normal range of motion. Cervical back: Normal range of motion and neck supple. Lymphadenopathy:      Cervical: No cervical adenopathy. Skin:     Findings: No erythema or rash. Neurological:      General: No focal deficit present. Mental Status: She is alert and oriented to person, place, and time. Mental status is at baseline. Cranial Nerves: No cranial nerve deficit. Motor: No abnormal muscle tone. Coordination: Coordination normal.      Deep Tendon Reflexes: Reflexes are normal and symmetric. Reflexes normal.   Psychiatric:         Mood and Affect: Mood normal.         Behavior: Behavior normal.         Thought Content: Thought content normal.         Judgment: Judgment normal.        There were no vitals filed for this visit. Assessment:      Diagnosis Orders   1. Anemia due to other cause, not classified  CBC      2. Iron deficiency  Iron and TIBC    Ferritin      3. Vitamin D deficiency  Vitamin D 25 Hydroxy      4. Vitamin B deficiency  CBC    Folate    Vitamin B12      5. Uses birth control        6. Screening, lipid  Comprehensive Metabolic Panel    Lipid Panel      7. Screening for thyroid disorder  T4, Free    TSH      8.  Hyperglycemia  Hemoglobin A1C            Plan:     Orders Placed This Encounter   Procedures    CBC Comprehensive Metabolic Panel    Folate    Hemoglobin A1C    Lipid Panel    T4, Free    TSH    Vitamin B12    Vitamin D 25 Hydroxy    Iron and TIBC    Ferritin       Outpatient Encounter Medications as of 1/25/2023   Medication Sig Dispense Refill    ferrous sulfate (IRON 325) 325 (65 Fe) MG tablet Take 1 tablet by mouth daily (with breakfast) 90 tablet 0    ascorbic acid (V-R VITAMIN C) 250 MG tablet Take 1 tablet by mouth daily 90 tablet 1    vitamin D (ERGOCALCIFEROL) 1.25 MG (89649 UT) CAPS capsule Take 1 capsule by mouth once a week 12 capsule 0    Cyanocobalamin (B-12) 1000 MCG SUBL Place 1 tablet under the tongue daily 90 tablet 5    [DISCONTINUED] norgestimate-ethinyl estradiol (ORTHO-CYCLEN) 0.25-35 MG-MCG per tablet Take 1 tablet by mouth daily      [DISCONTINUED] ferrous sulfate (IRON 325) 325 (65 Fe) MG tablet Take 1 tablet by mouth daily (with breakfast) 90 tablet 1    [DISCONTINUED] ascorbic acid (V-R VITAMIN C) 250 MG tablet Take 1 tablet by mouth daily 90 tablet 3    [DISCONTINUED] vitamin D (ERGOCALCIFEROL) 1.25 MG (39249 UT) CAPS capsule Take 1 capsule by mouth once a week 12 capsule 1     No facility-administered encounter medications on file as of 1/25/2023.      20 minutes spent with patient counseling/educating on acute/chronic medical health problems, medications,  along with treatment options. Reviewed multiple labs/imaging/medications with patient during this time. Following Diet discussion/education was done on healthy diet. In addition Exercise plan and depression screen were discussed. Recent labs/imaging were discussed and reviewed with patient. As chronic diarrhea stopped after iron supplement- this may restart after supplement is completed- advised to follow up with GI as previously recommended for this chronic problem.

## 2023-02-13 ENCOUNTER — TELEPHONE (OUTPATIENT)
Dept: FAMILY MEDICINE CLINIC | Age: 43
End: 2023-02-13

## 2023-02-13 DIAGNOSIS — J35.8 TONSILLOLITH: Primary | ICD-10-CM

## 2023-02-13 NOTE — TELEPHONE ENCOUNTER
----- Message from Phyllis Ra sent at 2/13/2023  3:52 PM EST -----  Subject: Referral Request    Reason for referral request? Patient is calling in for a referal for ENT   been dealing with tonsil stones that keep coming back and wanting to get   checked out by ENT. Provider patient wants to be referred to(if known):     Provider Phone Number(if known):     Additional Information for Provider?   ---------------------------------------------------------------------------  --------------  [address]    [phone]; OK to leave message on voicemail  ---------------------------------------------------------------------------  --------------

## 2023-03-28 ENCOUNTER — HOSPITAL ENCOUNTER (OUTPATIENT)
Age: 43
Setting detail: SPECIMEN
Discharge: HOME | End: 2023-03-28

## 2023-03-28 DIAGNOSIS — E53.9 VITAMIN B DEFICIENCY: ICD-10-CM

## 2023-03-28 DIAGNOSIS — D64.89 ANEMIA DUE TO OTHER CAUSE, NOT CLASSIFIED: ICD-10-CM

## 2023-03-28 DIAGNOSIS — Z13.29 SCREENING FOR THYROID DISORDER: ICD-10-CM

## 2023-03-28 DIAGNOSIS — Z13.220 SCREENING, LIPID: ICD-10-CM

## 2023-03-28 DIAGNOSIS — E61.1 IRON DEFICIENCY: ICD-10-CM

## 2023-03-28 DIAGNOSIS — E55.9 VITAMIN D DEFICIENCY: ICD-10-CM

## 2023-03-28 DIAGNOSIS — R73.9 HYPERGLYCEMIA: ICD-10-CM

## 2023-03-28 LAB
25(OH)D3 SERPL-MCNC: 67 NG/ML
ALBUMIN SERPL-MCNC: 4.4 G/DL (ref 3.5–5.2)
ALBUMIN/GLOBULIN RATIO: 1.5 (ref 1–2.5)
ALP SERPL-CCNC: 45 U/L (ref 35–104)
ALT SERPL-CCNC: 17 U/L (ref 5–33)
ANION GAP SERPL CALCULATED.3IONS-SCNC: 10 MMOL/L (ref 9–17)
AST SERPL-CCNC: 22 U/L
BILIRUB SERPL-MCNC: 0.8 MG/DL (ref 0.3–1.2)
BUN SERPL-MCNC: 13 MG/DL (ref 6–20)
CALCIUM SERPL-MCNC: 9.5 MG/DL (ref 8.6–10.4)
CHLORIDE SERPL-SCNC: 101 MMOL/L (ref 98–107)
CHOLEST SERPL-MCNC: 176 MG/DL
CHOLESTEROL/HDL RATIO: 2.1
CO2 SERPL-SCNC: 27 MMOL/L (ref 20–31)
CREAT SERPL-MCNC: 0.72 MG/DL (ref 0.5–0.9)
EST. AVERAGE GLUCOSE BLD GHB EST-MCNC: 103 MG/DL
FERRITIN SERPL-MCNC: 110 NG/ML (ref 13–150)
GFR SERPL CREATININE-BSD FRML MDRD: >60 ML/MIN/1.73M2
GLUCOSE SERPL-MCNC: 82 MG/DL (ref 70–99)
HBA1C MFR BLD: 5.2 % (ref 4–6)
HCT VFR BLD AUTO: 44.4 % (ref 36.3–47.1)
HDLC SERPL-MCNC: 82 MG/DL
HGB BLD-MCNC: 14.3 G/DL (ref 11.9–15.1)
IRON SATURATION: 90 % (ref 20–55)
IRON SERPL-MCNC: 228 UG/DL (ref 37–145)
LDLC SERPL CALC-MCNC: 83 MG/DL (ref 0–130)
MCH RBC QN AUTO: 29.9 PG (ref 25.2–33.5)
MCHC RBC AUTO-ENTMCNC: 32.2 G/DL (ref 28.4–34.8)
MCV RBC AUTO: 92.7 FL (ref 82.6–102.9)
NRBC AUTOMATED: 0 PER 100 WBC
PDW BLD-RTO: 13.2 % (ref 11.8–14.4)
PLATELET # BLD AUTO: 230 K/UL (ref 138–453)
PMV BLD AUTO: 10.2 FL (ref 8.1–13.5)
POTASSIUM SERPL-SCNC: 4 MMOL/L (ref 3.7–5.3)
PROT SERPL-MCNC: 7.3 G/DL (ref 6.4–8.3)
RBC # BLD: 4.79 M/UL (ref 3.95–5.11)
SODIUM SERPL-SCNC: 138 MMOL/L (ref 135–144)
T4 FREE SERPL-MCNC: 1.15 NG/DL (ref 0.93–1.7)
TIBC SERPL-MCNC: 254 UG/DL (ref 250–450)
TRIGL SERPL-MCNC: 55 MG/DL
TSH SERPL-ACNC: 1.89 UIU/ML (ref 0.3–5)
UNSATURATED IRON BINDING CAPACITY: 26 UG/DL (ref 112–347)
VIT B12 SERPL-MCNC: 1318 PG/ML (ref 232–1245)
WBC # BLD AUTO: 4.1 K/UL (ref 3.5–11.3)

## 2023-03-29 LAB — FOLATE SERPL-MCNC: 13.1 NG/ML

## 2023-03-30 ENCOUNTER — OFFICE VISIT (OUTPATIENT)
Dept: FAMILY MEDICINE CLINIC | Age: 43
End: 2023-03-30
Payer: COMMERCIAL

## 2023-03-30 VITALS
SYSTOLIC BLOOD PRESSURE: 93 MMHG | WEIGHT: 128.4 LBS | OXYGEN SATURATION: 99 % | HEART RATE: 54 BPM | TEMPERATURE: 96.8 F | HEIGHT: 69 IN | DIASTOLIC BLOOD PRESSURE: 60 MMHG | BODY MASS INDEX: 19.02 KG/M2

## 2023-03-30 DIAGNOSIS — E55.9 VITAMIN D DEFICIENCY: ICD-10-CM

## 2023-03-30 DIAGNOSIS — D64.89 ANEMIA DUE TO OTHER CAUSE, NOT CLASSIFIED: ICD-10-CM

## 2023-03-30 DIAGNOSIS — E61.1 IRON DEFICIENCY: ICD-10-CM

## 2023-03-30 DIAGNOSIS — E53.9 VITAMIN B DEFICIENCY: Primary | ICD-10-CM

## 2023-03-30 PROCEDURE — 99214 OFFICE O/P EST MOD 30 MIN: CPT | Performed by: FAMILY MEDICINE

## 2023-03-30 SDOH — ECONOMIC STABILITY: FOOD INSECURITY: WITHIN THE PAST 12 MONTHS, THE FOOD YOU BOUGHT JUST DIDN'T LAST AND YOU DIDN'T HAVE MONEY TO GET MORE.: NEVER TRUE

## 2023-03-30 SDOH — ECONOMIC STABILITY: HOUSING INSECURITY
IN THE LAST 12 MONTHS, WAS THERE A TIME WHEN YOU DID NOT HAVE A STEADY PLACE TO SLEEP OR SLEPT IN A SHELTER (INCLUDING NOW)?: NO

## 2023-03-30 SDOH — ECONOMIC STABILITY: FOOD INSECURITY: WITHIN THE PAST 12 MONTHS, YOU WORRIED THAT YOUR FOOD WOULD RUN OUT BEFORE YOU GOT MONEY TO BUY MORE.: NEVER TRUE

## 2023-03-30 SDOH — ECONOMIC STABILITY: INCOME INSECURITY: HOW HARD IS IT FOR YOU TO PAY FOR THE VERY BASICS LIKE FOOD, HOUSING, MEDICAL CARE, AND HEATING?: NOT HARD AT ALL

## 2023-03-30 ASSESSMENT — PATIENT HEALTH QUESTIONNAIRE - PHQ9
SUM OF ALL RESPONSES TO PHQ QUESTIONS 1-9: 0
SUM OF ALL RESPONSES TO PHQ QUESTIONS 1-9: 0
1. LITTLE INTEREST OR PLEASURE IN DOING THINGS: 0
SUM OF ALL RESPONSES TO PHQ QUESTIONS 1-9: 0
SUM OF ALL RESPONSES TO PHQ QUESTIONS 1-9: 0
SUM OF ALL RESPONSES TO PHQ9 QUESTIONS 1 & 2: 0
2. FEELING DOWN, DEPRESSED OR HOPELESS: 0

## 2023-03-30 ASSESSMENT — ENCOUNTER SYMPTOMS
SHORTNESS OF BREATH: 0
SORE THROAT: 0
EYE REDNESS: 0
NAUSEA: 0
STRIDOR: 0
ABDOMINAL PAIN: 0
VOMITING: 0
CONSTIPATION: 0
PHOTOPHOBIA: 0
COUGH: 0
BLOOD IN STOOL: 0
EYE DISCHARGE: 0
BACK PAIN: 0
EYE PAIN: 0
DIARRHEA: 0

## 2023-03-30 ASSESSMENT — ANXIETY QUESTIONNAIRES
4. TROUBLE RELAXING: 0
IF YOU CHECKED OFF ANY PROBLEMS ON THIS QUESTIONNAIRE, HOW DIFFICULT HAVE THESE PROBLEMS MADE IT FOR YOU TO DO YOUR WORK, TAKE CARE OF THINGS AT HOME, OR GET ALONG WITH OTHER PEOPLE: NOT DIFFICULT AT ALL
7. FEELING AFRAID AS IF SOMETHING AWFUL MIGHT HAPPEN: 0
3. WORRYING TOO MUCH ABOUT DIFFERENT THINGS: 0
6. BECOMING EASILY ANNOYED OR IRRITABLE: 0
5. BEING SO RESTLESS THAT IT IS HARD TO SIT STILL: 0
GAD7 TOTAL SCORE: 0
1. FEELING NERVOUS, ANXIOUS, OR ON EDGE: 0
2. NOT BEING ABLE TO STOP OR CONTROL WORRYING: 0

## 2023-03-30 NOTE — PROGRESS NOTES
Subjective:      Patient ID: Rizwana Ren is a 43 y.o. female. Here for follow up of labs - H/O iron def as well as vit D and B12 def. Was taking supplements. Her Hb is now over 14 and Iron levels are up- advised to stop iron supplements. Periods are less heavy and [facility] . B12 is high and Vit D is ok. Will stop high dose vit D and ask that she does a daily low dose vit D instead to maintain levels. She eats healthy and keeps active. No other sx at this time. Tonsillectomy scheduled for summer due to tonsil stones - Dr Anahy Cote. Review of Systems   Constitutional:  Negative for chills and fever. HENT:  Negative for congestion, ear pain, hearing loss, nosebleeds, sore throat and tinnitus. Eyes:  Negative for photophobia, pain, discharge and redness. Respiratory:  Negative for cough, shortness of breath and stridor. Cardiovascular:  Negative for chest pain, palpitations and leg swelling. Gastrointestinal:  Negative for abdominal pain, blood in stool, constipation, diarrhea, nausea and vomiting. Endocrine: Negative for polydipsia. Genitourinary:  Negative for dysuria, flank pain, frequency, hematuria and urgency. Musculoskeletal:  Negative for back pain, myalgias and neck pain. Skin:  Negative for rash. Allergic/Immunologic: Negative for environmental allergies. Neurological:  Negative for dizziness, tremors, seizures, weakness and headaches. Hematological:  Does not bruise/bleed easily. Psychiatric/Behavioral:  Negative for hallucinations and suicidal ideas. The patient is not nervous/anxious. Objective:   Physical Exam  Constitutional:       General: She is not in acute distress. Appearance: She is well-developed. HENT:      Head: Normocephalic and atraumatic.       Right Ear: External ear normal.      Left Ear: External ear normal.      Nose: Nose normal.      Mouth/Throat:      Mouth: Mucous membranes are moist.   Eyes:      Conjunctiva/sclera:

## 2023-05-04 ENCOUNTER — HOSPITAL ENCOUNTER (OUTPATIENT)
Age: 43
Setting detail: SPECIMEN
Discharge: HOME | End: 2023-05-04

## 2023-05-08 LAB — SURGICAL PATHOLOGY REPORT: NORMAL

## 2024-02-05 ENCOUNTER — OFFICE VISIT (OUTPATIENT)
Dept: FAMILY MEDICINE CLINIC | Age: 44
End: 2024-02-05
Payer: COMMERCIAL

## 2024-02-05 VITALS
BODY MASS INDEX: 19.2 KG/M2 | WEIGHT: 129.6 LBS | HEIGHT: 69 IN | DIASTOLIC BLOOD PRESSURE: 70 MMHG | HEART RATE: 52 BPM | SYSTOLIC BLOOD PRESSURE: 120 MMHG | OXYGEN SATURATION: 99 % | TEMPERATURE: 97.2 F

## 2024-02-05 DIAGNOSIS — E55.9 VITAMIN D DEFICIENCY: ICD-10-CM

## 2024-02-05 DIAGNOSIS — Z13.29 SCREENING FOR THYROID DISORDER: ICD-10-CM

## 2024-02-05 DIAGNOSIS — E53.9 VITAMIN B DEFICIENCY: ICD-10-CM

## 2024-02-05 DIAGNOSIS — J20.9 ACUTE TRACHEOBRONCHITIS: Primary | ICD-10-CM

## 2024-02-05 PROCEDURE — 99213 OFFICE O/P EST LOW 20 MIN: CPT | Performed by: FAMILY MEDICINE

## 2024-02-05 RX ORDER — PREDNISONE 5 MG/1
5 TABLET ORAL 2 TIMES DAILY
Qty: 10 TABLET | Refills: 0 | Status: SHIPPED | OUTPATIENT
Start: 2024-02-05 | End: 2024-02-10

## 2024-02-05 RX ORDER — AZITHROMYCIN 250 MG/1
250 TABLET, FILM COATED ORAL SEE ADMIN INSTRUCTIONS
Qty: 6 TABLET | Refills: 0 | Status: SHIPPED | OUTPATIENT
Start: 2024-02-05 | End: 2024-02-10

## 2024-02-05 SDOH — ECONOMIC STABILITY: FOOD INSECURITY: WITHIN THE PAST 12 MONTHS, YOU WORRIED THAT YOUR FOOD WOULD RUN OUT BEFORE YOU GOT MONEY TO BUY MORE.: NEVER TRUE

## 2024-02-05 SDOH — ECONOMIC STABILITY: INCOME INSECURITY: HOW HARD IS IT FOR YOU TO PAY FOR THE VERY BASICS LIKE FOOD, HOUSING, MEDICAL CARE, AND HEATING?: NOT HARD AT ALL

## 2024-02-05 SDOH — ECONOMIC STABILITY: FOOD INSECURITY: WITHIN THE PAST 12 MONTHS, THE FOOD YOU BOUGHT JUST DIDN'T LAST AND YOU DIDN'T HAVE MONEY TO GET MORE.: NEVER TRUE

## 2024-02-05 ASSESSMENT — PATIENT HEALTH QUESTIONNAIRE - PHQ9
2. FEELING DOWN, DEPRESSED OR HOPELESS: 0
SUM OF ALL RESPONSES TO PHQ QUESTIONS 1-9: 0
SUM OF ALL RESPONSES TO PHQ9 QUESTIONS 1 & 2: 0
SUM OF ALL RESPONSES TO PHQ QUESTIONS 1-9: 0
SUM OF ALL RESPONSES TO PHQ QUESTIONS 1-9: 0
1. LITTLE INTEREST OR PLEASURE IN DOING THINGS: 0
SUM OF ALL RESPONSES TO PHQ QUESTIONS 1-9: 0

## 2024-02-05 ASSESSMENT — ENCOUNTER SYMPTOMS
BACK PAIN: 0
NAUSEA: 0
CONSTIPATION: 0
COUGH: 0
BLOOD IN STOOL: 0
VOMITING: 0
SORE THROAT: 0
PHOTOPHOBIA: 0
STRIDOR: 0
ABDOMINAL PAIN: 0
SHORTNESS OF BREATH: 0
DIARRHEA: 0
EYE REDNESS: 0
EYE DISCHARGE: 0
EYE PAIN: 0

## 2024-02-05 NOTE — PROGRESS NOTES
regular rhythm.      Heart sounds: Normal heart sounds.   Pulmonary:      Effort: Pulmonary effort is normal.      Breath sounds: Normal breath sounds.   Abdominal:      General: Bowel sounds are normal. There is no distension.      Palpations: Abdomen is soft.      Tenderness: There is no abdominal tenderness.   Musculoskeletal:         General: Normal range of motion.      Cervical back: Normal range of motion and neck supple.   Skin:     General: Skin is warm and dry.   Neurological:      General: No focal deficit present.      Mental Status: She is alert and oriented to person, place, and time. Mental status is at baseline.   Psychiatric:         Mood and Affect: Mood normal.         Behavior: Behavior normal.         Thought Content: Thought content normal.         Judgment: Judgment normal.        Vitals:    02/05/24 1536   BP: 120/70   Pulse: 52   Temp: 97.2 °F (36.2 °C)   SpO2: 99%         Assessment:      Diagnosis Orders   1. Acute tracheobronchitis        2. Vitamin B deficiency        3. Vitamin D deficiency                Plan:     Orders Placed This Encounter   Procedures    CBC    Comprehensive Metabolic Panel    Folate    TSH    Vitamin B12    Vitamin D 25 Hydroxy       Outpatient Encounter Medications as of 2/5/2024   Medication Sig Dispense Refill    azithromycin (ZITHROMAX) 250 MG tablet Take 1 tablet by mouth See Admin Instructions for 5 days 500mg on day 1 followed by 250mg on days 2 - 5 6 tablet 0    dextromethorphan-guaiFENesin (MUCINEX DM)  MG per extended release tablet Take 1 tablet by mouth every 12 hours as needed for Cough 20 tablet 0    predniSONE (DELTASONE) 5 MG tablet Take 1 tablet by mouth 2 times daily for 5 days 10 tablet 0    [DISCONTINUED] ascorbic acid (V-R VITAMIN C) 250 MG tablet Take 1 tablet by mouth daily (Patient not taking: Reported on 2/5/2024) 90 tablet 1    [DISCONTINUED] Cyanocobalamin (B-12) 1000 MCG SUBL Place 1 tablet under the tongue daily (Patient not

## 2024-02-06 ENCOUNTER — TELEPHONE (OUTPATIENT)
Dept: FAMILY MEDICINE CLINIC | Age: 44
End: 2024-02-06

## 2024-02-06 NOTE — TELEPHONE ENCOUNTER
Pt took the prednisone and zpack that you prescribed yesterday and then accidentally took one of her sons Abilify tabs she then felt dizzy and nausea.  She no longer has the sx but want to know if it is ok to continue the meds you prescribed.

## 2024-05-09 ENCOUNTER — TELEPHONE (OUTPATIENT)
Dept: FAMILY MEDICINE CLINIC | Age: 44
End: 2024-05-09

## 2024-05-09 DIAGNOSIS — R11.0 NAUSEA: Primary | ICD-10-CM

## 2024-05-09 RX ORDER — ONDANSETRON 4 MG/1
4 TABLET, ORALLY DISINTEGRATING ORAL 3 TIMES DAILY PRN
Qty: 21 TABLET | Refills: 0 | Status: SHIPPED | OUTPATIENT
Start: 2024-05-09

## 2024-05-09 NOTE — TELEPHONE ENCOUNTER
----- Message from Odilai Prater sent at 5/9/2024  1:33 PM EDT -----  Subject: Message to Provider    QUESTIONS  Information for Provider? PATIENT IS GOING ON VACATION IN A LOCATION WITH   HIGH ALTITUDE AND SHE WOULD LIKE SOMETHING CALLED TO THE PHARMACY FOR   NAUSEA, SHE IS LEAVING ON IGLESIA 3,2024 PHARMACY? [facility] PLEASE   CALL TO ADIVJOSH  ---------------------------------------------------------------------------  --------------  CALL BACK INFO  [phone]; OK to leave message on voicemail  ---------------------------------------------------------------------------  --------------  SCRIPT ANSWERS  Relationship to Patient? Self

## 2024-05-09 NOTE — TELEPHONE ENCOUNTER
PATIENT IS GOING ON VACATION IN A LOCATION WITH   HIGH ALTITUDE AND SHE WOULD LIKE SOMETHING CALLED TO THE PHARMACY FOR   NAUSEA, SHE IS LEAVING ON IGLESIA 3,2024 PHARMACY? [address] PLEASE   CALL TO ADIVISE

## 2024-06-19 NOTE — PROGRESS NOTES
Dr. Madeleine Morin MD has requested that I see Brenita Lips for a consult for   1. Loose stools     . Past Medical, Family, and Social History reviewed and does contribute to the patient presenting condition. patient\"s PMH/PSH,SH,PSYCH hx, MEDs, ALLERGIES, and ROS was all reviewed and updated ion the appropriate sections  Dr. Madeleine Morin MD has requested that I see Brenita Lips for a consult for   1. Loose stools     . Past Medical, Family, and Social History reviewed and does contribute to the patient presenting condition.     patient\"s PMH/PSH,SH,PSYCH hx, MEDs, ALLERGIES, and ROS was all reviewed and updated ion the appropriate sections ORDERED THIS ENCOUNTER:  PHARMACY TO DOSE VANCOMYCIN  IP CONSULT TO PODIATRY  FINAL IMPRESSION      1. Wound infection          DISPOSITION/PLAN   DISPOSITION Admitted 06/19/2024 01:39:55 AM      OUTPATIENT FOLLOW UP THE PATIENT:  No follow-up provider specified.    DO Mao Baeza Nathan R, DO  06/19/24 0648

## 2024-10-29 ENCOUNTER — OFFICE VISIT (OUTPATIENT)
Dept: FAMILY MEDICINE CLINIC | Age: 44
End: 2024-10-29

## 2024-10-29 VITALS
HEIGHT: 69 IN | TEMPERATURE: 97.2 F | HEART RATE: 57 BPM | OXYGEN SATURATION: 100 % | WEIGHT: 129.2 LBS | BODY MASS INDEX: 19.13 KG/M2 | DIASTOLIC BLOOD PRESSURE: 61 MMHG | SYSTOLIC BLOOD PRESSURE: 95 MMHG

## 2024-10-29 DIAGNOSIS — Z12.4 PAP SMEAR FOR CERVICAL CANCER SCREENING: Primary | ICD-10-CM

## 2024-10-29 DIAGNOSIS — G47.09 OTHER INSOMNIA: ICD-10-CM

## 2024-10-29 DIAGNOSIS — F41.1 GAD (GENERALIZED ANXIETY DISORDER): ICD-10-CM

## 2024-10-29 RX ORDER — HYDROXYZINE HYDROCHLORIDE 10 MG/1
10 TABLET, FILM COATED ORAL NIGHTLY
Qty: 30 TABLET | Refills: 0 | Status: SHIPPED | OUTPATIENT
Start: 2024-10-29 | End: 2024-11-28

## 2024-10-29 RX ORDER — PAROXETINE HYDROCHLORIDE HEMIHYDRATE 12.5 MG/1
12.5 TABLET, FILM COATED, EXTENDED RELEASE ORAL DAILY
Qty: 30 TABLET | Refills: 0 | Status: SHIPPED | OUTPATIENT
Start: 2024-10-29

## 2024-10-29 ASSESSMENT — ENCOUNTER SYMPTOMS
CONSTIPATION: 0
EYE PAIN: 0
BACK PAIN: 0
STRIDOR: 0
SORE THROAT: 0
SHORTNESS OF BREATH: 0
NAUSEA: 0
BLOOD IN STOOL: 0
COUGH: 0
VOMITING: 0
EYE DISCHARGE: 0
PHOTOPHOBIA: 0
ABDOMINAL PAIN: 0
EYE REDNESS: 0
DIARRHEA: 0

## 2024-10-29 ASSESSMENT — PATIENT HEALTH QUESTIONNAIRE - PHQ9
5. POOR APPETITE OR OVEREATING: NOT AT ALL
7. TROUBLE CONCENTRATING ON THINGS, SUCH AS READING THE NEWSPAPER OR WATCHING TELEVISION: NOT AT ALL
7. TROUBLE CONCENTRATING ON THINGS, SUCH AS READING THE NEWSPAPER OR WATCHING TELEVISION: NOT AT ALL
SUM OF ALL RESPONSES TO PHQ QUESTIONS 1-9: 4
8. MOVING OR SPEAKING SO SLOWLY THAT OTHER PEOPLE COULD HAVE NOTICED. OR THE OPPOSITE, BEING SO FIGETY OR RESTLESS THAT YOU HAVE BEEN MOVING AROUND A LOT MORE THAN USUAL: NOT AT ALL
10. IF YOU CHECKED OFF ANY PROBLEMS, HOW DIFFICULT HAVE THESE PROBLEMS MADE IT FOR YOU TO DO YOUR WORK, TAKE CARE OF THINGS AT HOME, OR GET ALONG WITH OTHER PEOPLE: VERY DIFFICULT
1. LITTLE INTEREST OR PLEASURE IN DOING THINGS: NOT AT ALL
6. FEELING BAD ABOUT YOURSELF - OR THAT YOU ARE A FAILURE OR HAVE LET YOURSELF OR YOUR FAMILY DOWN: NOT AT ALL
6. FEELING BAD ABOUT YOURSELF - OR THAT YOU ARE A FAILURE OR HAVE LET YOURSELF OR YOUR FAMILY DOWN: NOT AT ALL
2. FEELING DOWN, DEPRESSED OR HOPELESS: NOT AT ALL
SUM OF ALL RESPONSES TO PHQ QUESTIONS 1-9: 2
SUM OF ALL RESPONSES TO PHQ QUESTIONS 1-9: 4
10. IF YOU CHECKED OFF ANY PROBLEMS, HOW DIFFICULT HAVE THESE PROBLEMS MADE IT FOR YOU TO DO YOUR WORK, TAKE CARE OF THINGS AT HOME, OR GET ALONG WITH OTHER PEOPLE: NOT DIFFICULT AT ALL
SUM OF ALL RESPONSES TO PHQ QUESTIONS 1-9: 2
SUM OF ALL RESPONSES TO PHQ QUESTIONS 1-9: 4
5. POOR APPETITE OR OVEREATING: NOT AT ALL
2. FEELING DOWN, DEPRESSED OR HOPELESS: NOT AT ALL
SUM OF ALL RESPONSES TO PHQ QUESTIONS 1-9: 2
3. TROUBLE FALLING OR STAYING ASLEEP: MORE THAN HALF THE DAYS
4. FEELING TIRED OR HAVING LITTLE ENERGY: MORE THAN HALF THE DAYS
4. FEELING TIRED OR HAVING LITTLE ENERGY: SEVERAL DAYS
SUM OF ALL RESPONSES TO PHQ QUESTIONS 1-9: 2
8. MOVING OR SPEAKING SO SLOWLY THAT OTHER PEOPLE COULD HAVE NOTICED. OR THE OPPOSITE, BEING SO FIGETY OR RESTLESS THAT YOU HAVE BEEN MOVING AROUND A LOT MORE THAN USUAL: NOT AT ALL
9. THOUGHTS THAT YOU WOULD BE BETTER OFF DEAD, OR OF HURTING YOURSELF: NOT AT ALL
SUM OF ALL RESPONSES TO PHQ9 QUESTIONS 1 & 2: 0
3. TROUBLE FALLING OR STAYING ASLEEP: SEVERAL DAYS
1. LITTLE INTEREST OR PLEASURE IN DOING THINGS: NOT AT ALL
SUM OF ALL RESPONSES TO PHQ QUESTIONS 1-9: 4
9. THOUGHTS THAT YOU WOULD BE BETTER OFF DEAD, OR OF HURTING YOURSELF: NOT AT ALL
SUM OF ALL RESPONSES TO PHQ9 QUESTIONS 1 & 2: 0

## 2024-10-29 ASSESSMENT — ANXIETY QUESTIONNAIRES
2. NOT BEING ABLE TO STOP OR CONTROL WORRYING: NOT AT ALL
7. FEELING AFRAID AS IF SOMETHING AWFUL MIGHT HAPPEN: NOT AT ALL
IF YOU CHECKED OFF ANY PROBLEMS ON THIS QUESTIONNAIRE, HOW DIFFICULT HAVE THESE PROBLEMS MADE IT FOR YOU TO DO YOUR WORK, TAKE CARE OF THINGS AT HOME, OR GET ALONG WITH OTHER PEOPLE: NOT DIFFICULT AT ALL
5. BEING SO RESTLESS THAT IT IS HARD TO SIT STILL: NOT AT ALL
6. BECOMING EASILY ANNOYED OR IRRITABLE: NOT AT ALL
GAD7 TOTAL SCORE: 0
3. WORRYING TOO MUCH ABOUT DIFFERENT THINGS: NOT AT ALL
4. TROUBLE RELAXING: NOT AT ALL
1. FEELING NERVOUS, ANXIOUS, OR ON EDGE: NOT AT ALL

## 2024-10-29 NOTE — PATIENT INSTRUCTIONS
Thank you for choosing [facility].  We know you have options when it comes to your healthcare; we appreciate that you chose us. Our goal is to provide exceptional  service and world class care to every patient.  You will be receiving a survey via email or text message asking for your feedback.  Please take a few minutes to share your thoughts about your recent visit. Your comments helps us understand what we do well and ways we can improve.  Thank you in advance for your valuable feedback.

## 2024-10-29 NOTE — PROGRESS NOTES
\"Have you been to the ER, urgent care clinic since your last visit?  Hospitalized since your last visit?\"    NO    “Have you seen or consulted any other health care providers outside our system since your last visit?”    NO     “Have you had a pap smear?”    YES - Where: [facility] Nurse/CMA to request most recent records if not in the chart    Date of last Cervical Cancer screen (HPV or PAP): 4/7/2014              
exudate.   Eyes:      General: No scleral icterus.        Right eye: No discharge.         Left eye: No discharge.      Conjunctiva/sclera: Conjunctivae normal.      Pupils: Pupils are equal, round, and reactive to light.   Neck:      Thyroid: No thyromegaly.      Vascular: No JVD.      Trachea: No tracheal deviation.   Cardiovascular:      Rate and Rhythm: Normal rate and regular rhythm.      Pulses: Normal pulses.      Heart sounds: Normal heart sounds. No murmur heard.     No friction rub. No gallop.   Pulmonary:      Effort: Pulmonary effort is normal.      Breath sounds: Normal breath sounds. No stridor.   Chest:      Chest wall: No tenderness.   Abdominal:      General: Bowel sounds are normal. There is no distension.      Palpations: Abdomen is soft.      Tenderness: There is no abdominal tenderness.   Musculoskeletal:         General: No tenderness or deformity. Normal range of motion.      Cervical back: Normal range of motion and neck supple.   Lymphadenopathy:      Cervical: No cervical adenopathy.   Skin:     General: Skin is warm and dry.      Findings: No erythema or rash.   Neurological:      General: No focal deficit present.      Mental Status: She is alert and oriented to person, place, and time. Mental status is at baseline.      Cranial Nerves: No cranial nerve deficit.      Motor: No abnormal muscle tone.      Coordination: Coordination normal.      Deep Tendon Reflexes: Reflexes are normal and symmetric. Reflexes normal.   Psychiatric:         Mood and Affect: Mood normal.         Behavior: Behavior normal.         Thought Content: Thought content normal.         Judgment: Judgment normal.        Vitals:    10/29/24 1615   BP: 95/61   Pulse: 57   Temp: 97.2 °F (36.2 °C)   SpO2: 100%         Assessment:       Orders           1.  Diagnosis Orders   1. Pap smear for cervical cancer screening        2. Other insomnia  hydrOXYzine HCl (ATARAX) 10 MG tablet      3. ABIMAEL (generalized anxiety disorder)

## 2025-07-02 ENCOUNTER — OFFICE VISIT (OUTPATIENT)
Dept: FAMILY MEDICINE CLINIC | Age: 45
End: 2025-07-02
Payer: COMMERCIAL

## 2025-07-02 VITALS
HEART RATE: 58 BPM | HEIGHT: 69 IN | SYSTOLIC BLOOD PRESSURE: 93 MMHG | OXYGEN SATURATION: 100 % | DIASTOLIC BLOOD PRESSURE: 63 MMHG | WEIGHT: 128.6 LBS | BODY MASS INDEX: 19.05 KG/M2

## 2025-07-02 DIAGNOSIS — Z13.29 SCREENING FOR THYROID DISORDER: ICD-10-CM

## 2025-07-02 DIAGNOSIS — G47.09 OTHER INSOMNIA: ICD-10-CM

## 2025-07-02 DIAGNOSIS — Z12.11 SCREEN FOR COLON CANCER: ICD-10-CM

## 2025-07-02 DIAGNOSIS — E61.1 IRON DEFICIENCY: ICD-10-CM

## 2025-07-02 DIAGNOSIS — E55.9 VITAMIN D DEFICIENCY: ICD-10-CM

## 2025-07-02 DIAGNOSIS — Z86.2 H/O SARCOIDOSIS: ICD-10-CM

## 2025-07-02 DIAGNOSIS — E53.9 VITAMIN B DEFICIENCY: ICD-10-CM

## 2025-07-02 DIAGNOSIS — F41.1 GAD (GENERALIZED ANXIETY DISORDER): Primary | ICD-10-CM

## 2025-07-02 DIAGNOSIS — Z13.220 SCREENING, LIPID: ICD-10-CM

## 2025-07-02 DIAGNOSIS — Z13.1 SCREENING FOR DIABETES MELLITUS: ICD-10-CM

## 2025-07-02 PROBLEM — Z78.9 USES BIRTH CONTROL: Status: RESOLVED | Noted: 2023-01-25 | Resolved: 2025-07-02

## 2025-07-02 PROCEDURE — 99214 OFFICE O/P EST MOD 30 MIN: CPT | Performed by: FAMILY MEDICINE

## 2025-07-02 SDOH — ECONOMIC STABILITY: FOOD INSECURITY: WITHIN THE PAST 12 MONTHS, THE FOOD YOU BOUGHT JUST DIDN'T LAST AND YOU DIDN'T HAVE MONEY TO GET MORE.: NEVER TRUE

## 2025-07-02 SDOH — ECONOMIC STABILITY: FOOD INSECURITY: WITHIN THE PAST 12 MONTHS, YOU WORRIED THAT YOUR FOOD WOULD RUN OUT BEFORE YOU GOT MONEY TO BUY MORE.: NEVER TRUE

## 2025-07-02 ASSESSMENT — ENCOUNTER SYMPTOMS
NAUSEA: 0
SHORTNESS OF BREATH: 0
STRIDOR: 0
BACK PAIN: 0
ABDOMINAL PAIN: 1
EYE PAIN: 0
DIARRHEA: 0
CONSTIPATION: 0
EYE REDNESS: 0
PHOTOPHOBIA: 0
VOMITING: 0
BLOOD IN STOOL: 0
EYE DISCHARGE: 0
SORE THROAT: 0
COUGH: 0

## 2025-07-02 ASSESSMENT — PATIENT HEALTH QUESTIONNAIRE - PHQ9
SUM OF ALL RESPONSES TO PHQ QUESTIONS 1-9: 0
1. LITTLE INTEREST OR PLEASURE IN DOING THINGS: NOT AT ALL
SUM OF ALL RESPONSES TO PHQ QUESTIONS 1-9: 0
SUM OF ALL RESPONSES TO PHQ9 QUESTIONS 1 & 2: 0
SUM OF ALL RESPONSES TO PHQ QUESTIONS 1-9: 0
SUM OF ALL RESPONSES TO PHQ QUESTIONS 1-9: 0
1. LITTLE INTEREST OR PLEASURE IN DOING THINGS: NOT AT ALL
2. FEELING DOWN, DEPRESSED OR HOPELESS: NOT AT ALL
SUM OF ALL RESPONSES TO PHQ QUESTIONS 1-9: 0
2. FEELING DOWN, DEPRESSED OR HOPELESS: NOT AT ALL
SUM OF ALL RESPONSES TO PHQ QUESTIONS 1-9: 0
1. LITTLE INTEREST OR PLEASURE IN DOING THINGS: NOT AT ALL
SUM OF ALL RESPONSES TO PHQ QUESTIONS 1-9: 0
SUM OF ALL RESPONSES TO PHQ QUESTIONS 1-9: 0
2. FEELING DOWN, DEPRESSED OR HOPELESS: NOT AT ALL

## 2025-07-02 NOTE — PROGRESS NOTES
Progress Note    Date:7/2/2025         Patient Name:Sho Quiros       YOB: 1980       Age:44 y.o.    Subjective/HPI     Chief Complaint   Patient presents with    Annual Exam    Orders     Colonoscopy       HPI Here for follow up of ABIMAEL and insomnia- states feels better now- off meds after taking it for 30 days.   States needs referral to GI for colonoscopy. Painful gas in evenings x last 6 years. States recently got worse.   No change in diet or stressStates has a Gyn Dr Best - needs a pap. States periods are now regular.  States exercises regularly - Jogs Q OD  for 35 min.  Bowels and bladder ok.  .No other complaints. Very active - jogs regularly and states less stress now that school is out.      Review of Systems   Review of Systems   Constitutional:  Negative for chills and fever.   HENT:  Negative for congestion, ear pain, hearing loss, nosebleeds, sore throat and tinnitus.    Eyes:  Negative for photophobia, pain, discharge and redness.   Respiratory:  Negative for cough, shortness of breath and stridor.    Cardiovascular:  Negative for chest pain, palpitations and leg swelling.   Gastrointestinal:  Positive for abdominal pain. Negative for blood in stool, constipation, diarrhea, nausea and vomiting.        Gas pains and some loose BM recently   Endocrine: Negative for polydipsia.   Genitourinary:  Negative for dysuria, flank pain, frequency, hematuria and urgency.   Musculoskeletal:  Negative for back pain, myalgias and neck pain.   Skin:  Negative for rash.   Allergic/Immunologic: Negative for environmental allergies.   Neurological:  Negative for dizziness, tremors, seizures, weakness and headaches.   Hematological:  Does not bruise/bleed easily.   Psychiatric/Behavioral:  Negative for hallucinations and suicidal ideas. The patient is not nervous/anxious.    All other systems reviewed and are negative.    Medications     No current outpatient medications on file.     No current

## 2025-08-06 LAB
ALBUMIN: 4.1 G/DL (ref 3.5–5.2)
ALK PHOSPHATASE: 51 U/L (ref 30–101)
ALT SERPL-CCNC: 16 U/L (ref 5–33)
ANION GAP SERPL CALCULATED.3IONS-SCNC: 10 MMOL/L (ref 7–16)
AST SERPL-CCNC: 22 U/L (ref 9–40)
BILIRUB SERPL-MCNC: 0.7 MG/DL
BUN BLDV-MCNC: 15 MG/DL (ref 6–20)
CALCIUM SERPL-MCNC: 9.1 MG/DL (ref 8.6–10.5)
CHLORIDE BLD-SCNC: 105 MMOL/L (ref 96–107)
CHOLESTEROL, TOTAL: 173 MG/DL (ref 100–199)
CHOLESTEROL/HDL RATIO: 2.4 (ref 2–4.5)
CO2: 26 MMOL/L (ref 18–32)
CREAT SERPL-MCNC: 0.93 MG/DL (ref 0.51–1.15)
EGFR IF NONAFRICAN AMERICAN: 77 ML/MIN/1.73M2
ESTIMATED AVERAGE GLUCOSE: 111 MG/DL
FERRITIN: 79 NG/ML (ref 13–200)
FOLATE: >20 NG/ML
GLUCOSE: 91 MG/DL (ref 70–100)
HBA1C MFR BLD: 5.5 %
HCT VFR BLD CALC: 41 % (ref 35–47)
HDLC SERPL-MCNC: 72 MG/DL
HEMOGLOBIN: 13.6 G/DL (ref 11.9–16)
IRON: 106 UG/DL (ref 37–145)
LDL CHOLESTEROL: 89 MG/DL
LDL/HDL RATIO: 1.2
MCH RBC QN AUTO: 30.1 PG (ref 26–33)
MCHC RBC AUTO-ENTMCNC: 33.2 G/DL (ref 32–35)
MCV RBC AUTO: 91 FL (ref 75–100)
PDW BLD-RTO: 11.9 % (ref 11.2–14.8)
PLATELET # BLD: 251 THOUS/CMM (ref 140–440)
POTASSIUM SERPL-SCNC: 4 MMOL/L (ref 3.5–5.4)
RBC # BLD: 4.52 MILL/CMM (ref 3.8–5.2)
SODIUM BLD-SCNC: 141 MMOL/L (ref 135–148)
TOTAL IRON BINDING CAPACITY: 284 UG/DL (ref 250–450)
TOTAL PROTEIN: 6.4 G/DL (ref 6–8.3)
TRIGL SERPL-MCNC: 59 MG/DL (ref 20–149)
TSH SERPL DL<=0.05 MIU/L-ACNC: 1.64 UIU/ML (ref 0.27–4.2)
UNSATURATED IRON BINDING CAPACITY: 178 UG/DL (ref 112–347)
VITAMIN B-12: >2000 PG/ML (ref 232–1245)
VITAMIN D 25-HYDROXY: 49.5 NG/ML (ref 30–100)
VLDLC SERPL CALC-MCNC: 12 MG/DL
WBC # BLD: 4 THDS/CMM (ref 3.6–11)